# Patient Record
Sex: MALE | Race: BLACK OR AFRICAN AMERICAN | Employment: FULL TIME | URBAN - METROPOLITAN AREA
[De-identification: names, ages, dates, MRNs, and addresses within clinical notes are randomized per-mention and may not be internally consistent; named-entity substitution may affect disease eponyms.]

---

## 2018-05-14 ENCOUNTER — HOSPITAL ENCOUNTER (OUTPATIENT)
Dept: OCCUPATIONAL MEDICINE | Age: 53
Discharge: HOME | End: 2018-05-14

## 2018-05-14 DIAGNOSIS — T14.90XA INJURY: ICD-10-CM

## 2018-07-09 PROBLEM — E66.01 CLASS 3 SEVERE OBESITY DUE TO EXCESS CALORIES WITH SERIOUS COMORBIDITY AND BODY MASS INDEX (BMI) OF 45.0 TO 49.9 IN ADULT (HCC): Status: ACTIVE | Noted: 2018-07-09

## 2018-07-26 ENCOUNTER — ANESTHESIA EVENT (OUTPATIENT)
Dept: SURGERY | Age: 53
End: 2018-07-26
Payer: OTHER MISCELLANEOUS

## 2018-07-27 ENCOUNTER — ANESTHESIA (OUTPATIENT)
Dept: SURGERY | Age: 53
End: 2018-07-27
Payer: OTHER MISCELLANEOUS

## 2018-07-27 ENCOUNTER — HOSPITAL ENCOUNTER (OUTPATIENT)
Age: 53
Setting detail: OUTPATIENT SURGERY
Discharge: HOME | End: 2018-07-27
Attending: ORTHOPAEDIC SURGERY | Admitting: ORTHOPAEDIC SURGERY
Payer: OTHER MISCELLANEOUS

## 2018-07-27 VITALS
OXYGEN SATURATION: 97 % | DIASTOLIC BLOOD PRESSURE: 86 MMHG | BODY MASS INDEX: 46.38 KG/M2 | RESPIRATION RATE: 20 BRPM | TEMPERATURE: 98 F | WEIGHT: 305 LBS | SYSTOLIC BLOOD PRESSURE: 172 MMHG | HEART RATE: 74 BPM

## 2018-07-27 PROCEDURE — 77030033269 HC SLV COMPR SCD KNE2 CARD -B: Performed by: ORTHOPAEDIC SURGERY

## 2018-07-27 PROCEDURE — 77030027384 HC PRB ARTHSCP SERFAS STRY -C: Performed by: ORTHOPAEDIC SURGERY

## 2018-07-27 PROCEDURE — 76060000034 HC ANESTHESIA 1.5 TO 2 HR: Performed by: ORTHOPAEDIC SURGERY

## 2018-07-27 PROCEDURE — 77030035253 HC BIT DRL ICONIX DISP STRY -B: Performed by: ORTHOPAEDIC SURGERY

## 2018-07-27 PROCEDURE — 77030011640 HC PAD GRND REM COVD -A: Performed by: ORTHOPAEDIC SURGERY

## 2018-07-27 PROCEDURE — 77030004453 HC BUR SHV STRY -B: Performed by: ORTHOPAEDIC SURGERY

## 2018-07-27 PROCEDURE — 76010010054 HC POST OP PAIN BLOCK: Performed by: ORTHOPAEDIC SURGERY

## 2018-07-27 PROCEDURE — 74011250636 HC RX REV CODE- 250/636: Performed by: ANESTHESIOLOGY

## 2018-07-27 PROCEDURE — 76210000006 HC OR PH I REC 0.5 TO 1 HR: Performed by: ORTHOPAEDIC SURGERY

## 2018-07-27 PROCEDURE — 74011250637 HC RX REV CODE- 250/637: Performed by: ANESTHESIOLOGY

## 2018-07-27 PROCEDURE — 77030006891 HC BLD SHV RESECT STRY -B: Performed by: ORTHOPAEDIC SURGERY

## 2018-07-27 PROCEDURE — 77030018836 HC SOL IRR NACL ICUM -A: Performed by: ORTHOPAEDIC SURGERY

## 2018-07-27 PROCEDURE — 74011250636 HC RX REV CODE- 250/636: Performed by: ORTHOPAEDIC SURGERY

## 2018-07-27 PROCEDURE — 77030036560 HC SHLDR ARM PAD ABDUCTN S2SG -B: Performed by: ORTHOPAEDIC SURGERY

## 2018-07-27 PROCEDURE — 74011000250 HC RX REV CODE- 250: Performed by: ANESTHESIOLOGY

## 2018-07-27 PROCEDURE — C1713 ANCHOR/SCREW BN/BN,TIS/BN: HCPCS | Performed by: ORTHOPAEDIC SURGERY

## 2018-07-27 PROCEDURE — 77030003666 HC NDL SPINAL BD -A: Performed by: ORTHOPAEDIC SURGERY

## 2018-07-27 PROCEDURE — 76210000020 HC REC RM PH II FIRST 0.5 HR: Performed by: ORTHOPAEDIC SURGERY

## 2018-07-27 PROCEDURE — 77030031139 HC SUT VCRL2 J&J -A: Performed by: ORTHOPAEDIC SURGERY

## 2018-07-27 PROCEDURE — 77030016370 HC SUT ULTBRD S&N -B: Performed by: ORTHOPAEDIC SURGERY

## 2018-07-27 PROCEDURE — 77030003660 HC NDL SHTTL S&N -C: Performed by: ORTHOPAEDIC SURGERY

## 2018-07-27 PROCEDURE — 77030002933 HC SUT MCRYL J&J -A: Performed by: ORTHOPAEDIC SURGERY

## 2018-07-27 PROCEDURE — 77030003602 HC NDL NRV BLK BBMI -B: Performed by: ANESTHESIOLOGY

## 2018-07-27 PROCEDURE — 74011000250 HC RX REV CODE- 250

## 2018-07-27 PROCEDURE — 76942 ECHO GUIDE FOR BIOPSY: CPT | Performed by: ORTHOPAEDIC SURGERY

## 2018-07-27 PROCEDURE — 77030021678 HC GLIDESCP STAT DISP VERT -B: Performed by: ANESTHESIOLOGY

## 2018-07-27 PROCEDURE — 77030013367: Performed by: ORTHOPAEDIC SURGERY

## 2018-07-27 PROCEDURE — 77030020052 HC SUT PASS DISP S&N -B: Performed by: ORTHOPAEDIC SURGERY

## 2018-07-27 PROCEDURE — 76010000162 HC OR TIME 1.5 TO 2 HR INTENSV-TIER 1: Performed by: ORTHOPAEDIC SURGERY

## 2018-07-27 PROCEDURE — 74011250636 HC RX REV CODE- 250/636

## 2018-07-27 PROCEDURE — 77030008703 HC TU ET UNCUF COVD -A: Performed by: ANESTHESIOLOGY

## 2018-07-27 PROCEDURE — 77030019605: Performed by: ORTHOPAEDIC SURGERY

## 2018-07-27 DEVICE — FOOTPRINT ULTRA PK SUTURE ANCHOR 4.5
Type: IMPLANTABLE DEVICE | Site: SHOULDER | Status: FUNCTIONAL
Brand: FOOTPRINT

## 2018-07-27 DEVICE — 1.4MM ICONIX 1 ANCHOR W/INTELLIBRAID TECHNOLOGY 1 STRAND #2 FORCE FIBER
Type: IMPLANTABLE DEVICE | Site: SHOULDER | Status: FUNCTIONAL
Brand: ICONIX

## 2018-07-27 RX ORDER — NEOSTIGMINE METHYLSULFATE 1 MG/ML
INJECTION INTRAVENOUS AS NEEDED
Status: DISCONTINUED | OUTPATIENT
Start: 2018-07-27 | End: 2018-07-27 | Stop reason: HOSPADM

## 2018-07-27 RX ORDER — LIDOCAINE HYDROCHLORIDE 10 MG/ML
0.1 INJECTION INFILTRATION; PERINEURAL AS NEEDED
Status: DISCONTINUED | OUTPATIENT
Start: 2018-07-27 | End: 2018-07-27 | Stop reason: HOSPADM

## 2018-07-27 RX ORDER — GLYCOPYRROLATE 0.2 MG/ML
INJECTION INTRAMUSCULAR; INTRAVENOUS AS NEEDED
Status: DISCONTINUED | OUTPATIENT
Start: 2018-07-27 | End: 2018-07-27 | Stop reason: HOSPADM

## 2018-07-27 RX ORDER — LIDOCAINE HYDROCHLORIDE 20 MG/ML
INJECTION, SOLUTION EPIDURAL; INFILTRATION; INTRACAUDAL; PERINEURAL AS NEEDED
Status: DISCONTINUED | OUTPATIENT
Start: 2018-07-27 | End: 2018-07-27 | Stop reason: HOSPADM

## 2018-07-27 RX ORDER — MIDAZOLAM HYDROCHLORIDE 1 MG/ML
2 INJECTION, SOLUTION INTRAMUSCULAR; INTRAVENOUS
Status: DISCONTINUED | OUTPATIENT
Start: 2018-07-27 | End: 2018-07-27 | Stop reason: HOSPADM

## 2018-07-27 RX ORDER — DEXAMETHASONE SODIUM PHOSPHATE 4 MG/ML
INJECTION, SOLUTION INTRA-ARTICULAR; INTRALESIONAL; INTRAMUSCULAR; INTRAVENOUS; SOFT TISSUE AS NEEDED
Status: DISCONTINUED | OUTPATIENT
Start: 2018-07-27 | End: 2018-07-27 | Stop reason: HOSPADM

## 2018-07-27 RX ORDER — SODIUM CHLORIDE, SODIUM LACTATE, POTASSIUM CHLORIDE, CALCIUM CHLORIDE 600; 310; 30; 20 MG/100ML; MG/100ML; MG/100ML; MG/100ML
75 INJECTION, SOLUTION INTRAVENOUS CONTINUOUS
Status: DISCONTINUED | OUTPATIENT
Start: 2018-07-27 | End: 2018-07-27 | Stop reason: HOSPADM

## 2018-07-27 RX ORDER — PROPOFOL 10 MG/ML
INJECTION, EMULSION INTRAVENOUS AS NEEDED
Status: DISCONTINUED | OUTPATIENT
Start: 2018-07-27 | End: 2018-07-27 | Stop reason: HOSPADM

## 2018-07-27 RX ORDER — OXYCODONE HYDROCHLORIDE 5 MG/1
5 TABLET ORAL
Status: DISCONTINUED | OUTPATIENT
Start: 2018-07-27 | End: 2018-07-27 | Stop reason: HOSPADM

## 2018-07-27 RX ORDER — NALOXONE HYDROCHLORIDE 0.4 MG/ML
0.1 INJECTION, SOLUTION INTRAMUSCULAR; INTRAVENOUS; SUBCUTANEOUS
Status: DISCONTINUED | OUTPATIENT
Start: 2018-07-27 | End: 2018-07-27 | Stop reason: HOSPADM

## 2018-07-27 RX ORDER — MIDAZOLAM HYDROCHLORIDE 1 MG/ML
2 INJECTION, SOLUTION INTRAMUSCULAR; INTRAVENOUS ONCE
Status: COMPLETED | OUTPATIENT
Start: 2018-07-27 | End: 2018-07-27

## 2018-07-27 RX ORDER — SUCCINYLCHOLINE CHLORIDE 20 MG/ML
INJECTION INTRAMUSCULAR; INTRAVENOUS AS NEEDED
Status: DISCONTINUED | OUTPATIENT
Start: 2018-07-27 | End: 2018-07-27 | Stop reason: HOSPADM

## 2018-07-27 RX ORDER — EPINEPHRINE 1 MG/ML
INJECTION, SOLUTION, CONCENTRATE INTRAVENOUS AS NEEDED
Status: DISCONTINUED | OUTPATIENT
Start: 2018-07-27 | End: 2018-07-27 | Stop reason: HOSPADM

## 2018-07-27 RX ORDER — EPHEDRINE SULFATE 50 MG/ML
INJECTION, SOLUTION INTRAVENOUS AS NEEDED
Status: DISCONTINUED | OUTPATIENT
Start: 2018-07-27 | End: 2018-07-27 | Stop reason: HOSPADM

## 2018-07-27 RX ORDER — KETOROLAC TROMETHAMINE 30 MG/ML
30 INJECTION, SOLUTION INTRAMUSCULAR; INTRAVENOUS ONCE
Status: COMPLETED | OUTPATIENT
Start: 2018-07-27 | End: 2018-07-27

## 2018-07-27 RX ORDER — DIPHENHYDRAMINE HYDROCHLORIDE 50 MG/ML
12.5 INJECTION, SOLUTION INTRAMUSCULAR; INTRAVENOUS
Status: DISCONTINUED | OUTPATIENT
Start: 2018-07-27 | End: 2018-07-27 | Stop reason: HOSPADM

## 2018-07-27 RX ORDER — FAMOTIDINE 20 MG/1
20 TABLET, FILM COATED ORAL ONCE
Status: COMPLETED | OUTPATIENT
Start: 2018-07-27 | End: 2018-07-27

## 2018-07-27 RX ORDER — FLUMAZENIL 0.1 MG/ML
0.2 INJECTION INTRAVENOUS AS NEEDED
Status: DISCONTINUED | OUTPATIENT
Start: 2018-07-27 | End: 2018-07-27 | Stop reason: HOSPADM

## 2018-07-27 RX ORDER — ROCURONIUM BROMIDE 10 MG/ML
INJECTION, SOLUTION INTRAVENOUS AS NEEDED
Status: DISCONTINUED | OUTPATIENT
Start: 2018-07-27 | End: 2018-07-27 | Stop reason: HOSPADM

## 2018-07-27 RX ORDER — HYDROMORPHONE HYDROCHLORIDE 2 MG/ML
0.5 INJECTION, SOLUTION INTRAMUSCULAR; INTRAVENOUS; SUBCUTANEOUS
Status: DISCONTINUED | OUTPATIENT
Start: 2018-07-27 | End: 2018-07-27 | Stop reason: HOSPADM

## 2018-07-27 RX ORDER — ONDANSETRON 2 MG/ML
INJECTION INTRAMUSCULAR; INTRAVENOUS AS NEEDED
Status: DISCONTINUED | OUTPATIENT
Start: 2018-07-27 | End: 2018-07-27 | Stop reason: HOSPADM

## 2018-07-27 RX ORDER — FENTANYL CITRATE 50 UG/ML
100 INJECTION, SOLUTION INTRAMUSCULAR; INTRAVENOUS ONCE
Status: COMPLETED | OUTPATIENT
Start: 2018-07-27 | End: 2018-07-27

## 2018-07-27 RX ORDER — LABETALOL HYDROCHLORIDE 5 MG/ML
10 INJECTION, SOLUTION INTRAVENOUS ONCE
Status: COMPLETED | OUTPATIENT
Start: 2018-07-27 | End: 2018-07-27

## 2018-07-27 RX ORDER — OXYCODONE HYDROCHLORIDE 5 MG/1
10 TABLET ORAL
Status: DISCONTINUED | OUTPATIENT
Start: 2018-07-27 | End: 2018-07-27 | Stop reason: HOSPADM

## 2018-07-27 RX ADMIN — EPHEDRINE SULFATE 2.5 MG: 50 INJECTION, SOLUTION INTRAVENOUS at 10:16

## 2018-07-27 RX ADMIN — FENTANYL CITRATE 50 MCG: 50 INJECTION INTRAMUSCULAR; INTRAVENOUS at 08:42

## 2018-07-27 RX ADMIN — SODIUM CHLORIDE, SODIUM LACTATE, POTASSIUM CHLORIDE, AND CALCIUM CHLORIDE: 600; 310; 30; 20 INJECTION, SOLUTION INTRAVENOUS at 10:58

## 2018-07-27 RX ADMIN — Medication 3 G: at 09:34

## 2018-07-27 RX ADMIN — EPHEDRINE SULFATE 10 MG: 50 INJECTION, SOLUTION INTRAVENOUS at 10:07

## 2018-07-27 RX ADMIN — PROPOFOL 200 MG: 10 INJECTION, EMULSION INTRAVENOUS at 09:36

## 2018-07-27 RX ADMIN — GLYCOPYRROLATE 0.3 MG: 0.2 INJECTION INTRAMUSCULAR; INTRAVENOUS at 10:47

## 2018-07-27 RX ADMIN — MIDAZOLAM HYDROCHLORIDE 2 MG: 1 INJECTION, SOLUTION INTRAMUSCULAR; INTRAVENOUS at 08:42

## 2018-07-27 RX ADMIN — SODIUM CHLORIDE, SODIUM LACTATE, POTASSIUM CHLORIDE, AND CALCIUM CHLORIDE 75 ML/HR: 600; 310; 30; 20 INJECTION, SOLUTION INTRAVENOUS at 08:00

## 2018-07-27 RX ADMIN — ROCURONIUM BROMIDE 10 MG: 10 INJECTION, SOLUTION INTRAVENOUS at 09:46

## 2018-07-27 RX ADMIN — SUCCINYLCHOLINE CHLORIDE 180 MG: 20 INJECTION INTRAMUSCULAR; INTRAVENOUS at 09:36

## 2018-07-27 RX ADMIN — FAMOTIDINE 20 MG: 20 TABLET ORAL at 08:00

## 2018-07-27 RX ADMIN — EPHEDRINE SULFATE 5 MG: 50 INJECTION, SOLUTION INTRAVENOUS at 10:10

## 2018-07-27 RX ADMIN — KETOROLAC TROMETHAMINE 30 MG: 30 INJECTION, SOLUTION INTRAMUSCULAR at 11:19

## 2018-07-27 RX ADMIN — LIDOCAINE HYDROCHLORIDE 60 MG: 20 INJECTION, SOLUTION EPIDURAL; INFILTRATION; INTRACAUDAL; PERINEURAL at 09:36

## 2018-07-27 RX ADMIN — LABETALOL HYDROCHLORIDE 10 MG: 5 INJECTION INTRAVENOUS at 11:30

## 2018-07-27 RX ADMIN — DEXAMETHASONE SODIUM PHOSPHATE 4 MG: 4 INJECTION, SOLUTION INTRA-ARTICULAR; INTRALESIONAL; INTRAMUSCULAR; INTRAVENOUS; SOFT TISSUE at 10:15

## 2018-07-27 RX ADMIN — NEOSTIGMINE METHYLSULFATE 2 MG: 1 INJECTION INTRAVENOUS at 10:47

## 2018-07-27 RX ADMIN — EPHEDRINE SULFATE 2.5 MG: 50 INJECTION, SOLUTION INTRAVENOUS at 10:19

## 2018-07-27 RX ADMIN — ONDANSETRON 4 MG: 2 INJECTION INTRAMUSCULAR; INTRAVENOUS at 10:16

## 2018-07-27 NOTE — BRIEF OP NOTE
BRIEF OPERATIVE NOTE Date of Procedure: 7/27/2018 Preoperative Diagnosis: Strain of muscle(s) and tendon(s) of the rotator cuff of left shoulder, initial encounter [S46.012A] Strain of muscle, fascia and tendon of other parts of biceps, left arm, initial encounter [Y06.081H] Degenerative joint disease of left acromioclavicular joint [M19.012] Postoperative Diagnosis: Left Shoulder rotator cuff tear Procedure(s): SHOULDER ARTHROSCOPY ROTATOR CUFF REPAIR, BICEPS TENOTOMY, RESECTION DISTAL CLAVICLE/ LEFT/ INTERSCALENE Surgeon(s) and Role: Rickie Alvarez MD - Primary Surgical Assistant:  
 
 
Surgical Staff: 
Circ-1: Nichole Salter RN 
Circ-Relief: Deann Alston RN Scrub Tech-1: Sonia Desouza Scrub Tech-2: Quentin Paulino Event Time In Incision Start 1000 Incision Close Anesthesia: General  
Estimated Blood Loss:  
 
Specimens: * No specimens in log * Findings:  
  
Complications:  
 
Implants:  
Implant Name Type Inv. Item Serial No.  Lot No. LRB No. Used Action ANCHOR FOOTPRINT SUT 4.5MM --  - [id number]  ANCHOR FOOTPRINT SUT 4.5MM --   [employer] [id number] Left 1 Implanted ANCHOR SUT 1.4MM FORCEFIBER 2 -- [employer]IX 1 [employer] - [id number]   ANCHOR SUT 1.4MM FORCEFIBER 2 -- [employer]IX 1 [employer]   [employer] [id number] Left 1 Implanted

## 2018-07-27 NOTE — ANESTHESIA PROCEDURE NOTES
Peripheral Block Start time: 7/27/2018 8:43 AM 
End time: 7/27/2018 8:47 AM 
Performed by: Anahi Cheng Authorized by: Kathy Levi: Indications: at surgeon's request and post-op pain management Preanesthetic Checklist: patient identified, risks and benefits discussed, site marked, timeout performed, anesthesia consent given and patient being monitored Timeout Time: 08:42 Block Type:  
Block Type: Interscalene Laterality:  Left Monitoring:  Standard ASA monitoring, responsive to questions, oxygen, continuous pulse ox, frequent vital sign checks and heart rate Injection Technique:  Single shot Procedures: ultrasound guided and nerve stimulator Patient Position: supine Prep: chlorhexidine Location:  Interscalene Needle Type:  Stimuplex Needle Gauge:  22 G Needle Localization:  Nerve stimulator and ultrasound guidance Motor Response: minimal motor response >0.4 mA Medication Injected:  0.375% 
ropivacaine Adds:  Epi 1:200K Volume (mL):  35 
 
Assessment: 
Number of attempts:  1 Injection Assessment:  Incremental injection every 5 mL, negative aspiration for CSF, no paresthesia, ultrasound image on chart, local visualized surrounding nerve on ultrasound, negative aspiration for blood and no intravascular symptoms Patient tolerance:  Patient tolerated the procedure well with no immediate complications

## 2018-07-27 NOTE — PERIOP NOTES
New TORB from Dr. Teodoro Trejo. Toradol 30 mg IV given once now. Labatelol 10 mg IV given once now. See MAR.

## 2018-07-27 NOTE — DISCHARGE INSTRUCTIONS
Post-Operative Instructions   For  Shoulder Arthroscopy Rotator Cuff Repair  Phone:  [phone]    1. Apply ice to the shoulder as needed. 2. You may shower after the arthroscopy. Keep dressings in place for the first three days and do not get them wet. After three days, you may remove the dressings and leave the steri-strip bandages in place; they will peel off naturally. 3. Stay in sling at all times except to shower. 4. Begin therapy as ordered. 5. Use any pain medication as instructed. You should take your pain medication as soon as you feel the anesthetic wearing off. Do not wait until you are in severe pain to begin taking your pain medication. 6. You may have some side effects from your pain medication. If you have nausea, try taking your medication with food. For itching, you may take over the counter Benadryl. 7. You may have been given a prescription for Phenergan. This medication is used for nausea and vomiting. You do not need to get this prescription filled unless you have a problem. 8. If you have a problem, please call [address] [place] [address] at [phone]    Dmitriy Henrandez MD    [address] [place] [address], P.A.     TYPICAL SIDE EFFECTS OF PAIN MEDICATION:  *    Constipation: Drink lots of fluids. Over the counter stool softener if needed. *    Nausea: Take pain medication with food. Call your doctor with persistent nausea. ACTIVITY  · As tolerated and as directed by your doctor. · Bathe or shower as directed by your doctor. DIET  · Day of surgery: Clear liquids until no nausea or vomiting; small portion, light diet Toole foods (ex: baked chicken, plain rice, grits, scrambled eggs, toast). Nothing greasy, fried or spicy today. · Advance to regular diet on second day, unless your doctor orders otherwise. · If nausea and vomiting continues, call your doctor. PAIN  · Take pain medication as directed by your doctor.     · DO NOT take aspirin or blood thinners unless directed by your doctor. CALL YOUR DOCTOR IF    s Call your doctor if pain is NOT relieved by medication.   s Excessive bleeding that does not stop after holding pressure over the area  · Temperature of 101 degrees F or above  · Excessive redness, swelling or bruising, and/ or green or yellow, smelly discharge from incision    AFTER ANESTHESIA   · For the first 24 hours: DO NOT Drive, Drink alcoholic beverages, or Make important decisions. · Be aware of dizziness following anesthesia and while taking pain medication. DISCHARGE SUMMARY from Nurse    PATIENT INSTRUCTIONS:    After general anesthesia or intravenous sedation, for 24 hours or while taking prescription Narcotics:  · Limit your activities  · Do not drive and operate hazardous machinery  · Do not make important personal or business decisions  · Do  not drink alcoholic beverages  · If you have not urinated within 8 hours after discharge, please contact your surgeon on call. *  Please give a list of your current medications to your Primary Care Provider. *  Please update this list whenever your medications are discontinued, doses are      changed, or new medications (including over-the-counter products) are added. *  Please carry medication information at all times in case of emergency situations. Preventing Infection at Home  We care about preventing infection and avoiding the spread of germs - not only when you are in the hospital but also when you return home. When you return home from the hospital, its important to take the following steps to help prevent infection and avoid spreading germs that could infect you and others. Ask everyone in your home to follow these guidelines, too. Clean Your Hands  · Clean your hands whenever your hands are visibly dirty, before you eat, before or after touching your mouth, nose or eyes, and before preparing food.  Clean them after contact with body fluids, using the restroom, touching animals or changing diapers. · When washing hands, wet them with warm water and work up a lather. Rub hands for at least 15 seconds, then rinse them and pat them dry with a clean towel or paper towel. · When using hand sanitizers, it should take about 15 seconds to rub your hands dry. If not, you probably didnt apply enough . Cover Your Sneeze or Cough  Germs are released into the air whenever you sneeze or cough. To prevent the spread of infection:  · Turn away from other people before coughing or sneezing. · Cover your mouth or nose with a tissue when you cough or sneeze. Put the tissue in the trash. · If you dont have a tissue, cough or sneeze into your upper sleeve, not your hands. · Always clean your hands after coughing or sneezing. Care for Wounds  Your skin is your bodys first line of defense against germs, but an open wound leaves an easy way for germs to enter your body. To prevent infection:  · Clean your hands before and after changing wound dressings, and wear gloves to change dressings if recommended by your doctor. · Take special care with IV lines or other devices inserted into the body. If you must touch them, clean your hands first.  · Follow any specific instructions from your doctor to care for your wounds. Contact your doctor if you experience any signs of infection, such as fever or increased redness at the surgical or wound site. Keep a Clean Home  · Clean or wipe commonly touched hard surfaces like door handles, sinks, tabletops, phones and TV remotes. · Use products labeled disinfectant to kill harmful bacteria and viruses. · Use a clean cloth or paper towel to clean and dry surfaces. Wiping surfaces with a dirty dishcloth, sponge or towel will only spread germs. · Never share toothbrushes, saenz, drinking glasses, utensils, razor blades, face cloths or bath towels to avoid spreading germs.   · Be sure that the linens that you sleep on are clean.  · Keep pets away from wounds and wash your hands after touching pets, their toys or bedding. We care about you and your health. Remember, preventing infections is a team effort between you, your family, friends and health care providers. These are general instructions for a healthy lifestyle:    No smoking/ No tobacco products/ Avoid exposure to second hand smoke    Surgeon General's Warning:  Quitting smoking now greatly reduces serious risk to your health. Obesity, smoking, and sedentary lifestyle greatly increases your risk for illness    A healthy diet, regular physical exercise & weight monitoring are important for maintaining a healthy lifestyle    You may be retaining fluid if you have a history of heart failure or if you experience any of the following symptoms:  Weight gain of 3 pounds or more overnight or 5 pounds in a week, increased swelling in our hands or feet or shortness of breath while lying flat in bed. Please call your doctor as soon as you notice any of these symptoms; do not wait until your next office visit. Recognize signs and symptoms of STROKE:    F-face looks uneven    A-arms unable to move or move unevenly    S-speech slurred or non-existent    T-time-call 911 as soon as signs and symptoms begin-DO NOT go       Back to bed or wait to see if you get better-TIME IS BRAIN.

## 2018-07-27 NOTE — H&P
Outpatient Surgery History and Physical:  Gris Mason was seen and examined. CHIEF COMPLAINT:    l shoulder . PE:   There were no vitals taken for this visit. Heart:   Regular rhythm      Lungs:  Are clear      Past Medical History:    Patient Active Problem List    Diagnosis    Class 3 severe obesity due to excess calories with serious comorbidity and body mass index (BMI) of 45.0 to 49.9 in adult ([address]    Hyperlipidemia    IFG (impaired fasting glucose)    Hypertension       Surgical History:   Past Surgical History:   Procedure Laterality Date    HX APPENDECTOMY  as child       Social History: Patient  reports that he has never smoked. He has never used smokeless tobacco. He reports that he drinks about 1.8 oz of alcohol per week  He reports that he does not use illicit drugs. Family History:   Family History   Problem Relation Age of Onset    Heart Disease Father     Arthritis-osteo Mother     Heart Attack Mother     Diabetes Maternal Grandmother     Lung Cancer Maternal Grandfather      smoker       Allergies: Reviewed per EMR  No Known Allergies    Medications:    No current facility-administered medications on file prior to encounter. Current Outpatient Prescriptions on File Prior to Encounter   Medication Sig    cyclobenzaprine (FLEXERIL) 5 mg tablet Take 5 mg by mouth nightly.  lisinopril (PRINIVIL, ZESTRIL) 20 mg tablet Take 1 Tab by mouth daily. The surgery is planned for the  Shoulder . History and physical has been reviewed. The patient has been examined. There have been no significant clinical changes since the completion of the originally dated History and Physical.  Patient identified by surgeon; surgical site was confirmed by patient and surgeon. The patient is here today for outpatient surgery. I have examined the patient, no changes are noted in the patient's medical status.  Necessity for the procedure/care is still present and the history and physical above is current. See the office notes for the full long term history of the problem. Please see the recent office notes for the musculoskeletal examination.     Signed By: Kori Perez MD     July 27, 2018 6:53 AM

## 2018-07-27 NOTE — IP AVS SNAPSHOT
[address] 
[phone] Patient: Cece Velasco MRN: [id number] :1965 About your hospitalization You were admitted on:  2018 You last received care in the:  [facility] OP PACU You were discharged on:  2018 Why you were hospitalized Your primary diagnosis was:  Not on File Follow-up Information Follow up With Details Comments Contact Info Ian Torrez MD  Office will call to schedule follow up. [place] [address] [facility] [address] 
[phone] Yeni Acevedo MD   [address] [facility] [address] [facility] [address] 
[phone] Your Scheduled Appointments 2018  8:55 AM EDT  
LAB with [facility] [address] [place] [address] ([facility] INTERNAL MEDICINE) [address] [place] [id number] [phone] Wednesday August 15, 2018  9:00 AM EDT Complete Physical with Yeni Acevedo MD  
[address] [place] [address] Primary [facility] ([facility] INTERNAL MEDICINE) [address] [place] [address] [address] [place] [phone] Discharge Orders None A check susi indicates which time of day the medication should be taken. My Medications ASK your doctor about these medications Instructions Each Dose to Equal  
 Morning Noon Evening Bedtime  
 cyclobenzaprine 5 mg tablet Commonly known as:  FLEXERIL Your last dose was: Your next dose is: Take 5 mg by mouth nightly. 5 mg  
    
   
   
   
  
 lisinopril 20 mg tablet Commonly known as:  Mollie Xavi Your last dose was: Your next dose is: Take 1 Tab by mouth daily. 20 mg  
    
   
   
   
  
 multivitamin tablet Commonly known as:  ONE A DAY Your last dose was: Your next dose is: Take 1 Tab by mouth daily. Stopped 7/26/18  
 1 Tab Discharge Instructions Post-Operative Instructions For Shoulder Arthroscopy Rotator Cuff Repair Phone:  [phone] 1. Apply ice to the shoulder as needed. 2. You may shower after the arthroscopy. Keep dressings in place for the first three days and do not get them wet. After three days, you may remove the dressings and leave the steri-strip bandages in place; they will peel off naturally. 3. Stay in sling at all times except to shower. 4. Begin therapy as ordered. 5. Use any pain medication as instructed. You should take your pain medication as soon as you feel the anesthetic wearing off. Do not wait until you are in severe pain to begin taking your pain medication. 6. You may have some side effects from your pain medication. If you have nausea, try taking your medication with food. For itching, you may take over the counter Benadryl. 7. You may have been given a prescription for Phenergan. This medication is used for nausea and vomiting. You do not need to get this prescription filled unless you have a problem. 8. If you have a problem, please call [place] at [phone] Viraj Romero MD 
 
[facility]  
 
TYPICAL SIDE EFFECTS OF PAIN MEDICATION: 
*    Constipation: Drink lots of fluids. Over the counter stool softener if needed. *    Nausea: Take pain medication with food. Call your doctor with persistent nausea. ACTIVITY · As tolerated and as directed by your doctor. · Bathe or shower as directed by your doctor. DIET 
· Day of surgery: Clear liquids until no nausea or vomiting; small portion, light diet Luna foods (ex: baked chicken, plain rice, grits, scrambled eggs, toast). Nothing greasy, fried or spicy today. · Advance to regular diet on second day, unless your doctor orders otherwise. · If nausea and vomiting continues, call your doctor. PAIN 
· Take pain medication as directed by your doctor. · DO NOT take aspirin or blood thinners unless directed by your doctor. CALL YOUR DOCTOR IF   
s Call your doctor if pain is NOT relieved by medication.  
s Excessive bleeding that does not stop after holding pressure over the area · Temperature of 101 degrees F or above · Excessive redness, swelling or bruising, and/ or green or yellow, smelly discharge from incision AFTER ANESTHESIA · For the first 24 hours: DO NOT Drive, Drink alcoholic beverages, or Make important decisions. · Be aware of dizziness following anesthesia and while taking pain medication. DISCHARGE SUMMARY from Nurse PATIENT INSTRUCTIONS: 
 
After general anesthesia or intravenous sedation, for 24 hours or while taking prescription Narcotics: · Limit your activities · Do not drive and operate hazardous machinery · Do not make important personal or business decisions · Do  not drink alcoholic beverages · If you have not urinated within 8 hours after discharge, please contact your surgeon on call. *  Please give a list of your current medications to your Primary Care Provider. *  Please update this list whenever your medications are discontinued, doses are 
    changed, or new medications (including over-the-counter products) are added. *  Please carry medication information at all times in case of emergency situations. Preventing Infection at Home We care about preventing infection and avoiding the spread of germs  not only when you are in the hospital but also when you return home. When you return home from the hospital, its important to take the following steps to help prevent infection and avoid spreading germs that could infect you and others. Ask everyone in your home to follow these guidelines, too. Clean Your Hands · Clean your hands whenever your hands are visibly dirty, before you eat, before or after touching your mouth, nose or eyes, and before preparing food. Clean them after contact with body fluids, using the restroom, touching animals or changing diapers. · When washing hands, wet them with warm water and work up a lather. Rub hands for at least 15 seconds, then rinse them and pat them dry with a clean towel or paper towel. · When using hand sanitizers, it should take about 15 seconds to rub your hands dry. If not, you probably didnt apply enough . Cover Your Sneeze or Cough Germs are released into the air whenever you sneeze or cough. To prevent the spread of infection: · Turn away from other people before coughing or sneezing. · Cover your mouth or nose with a tissue when you cough or sneeze. Put the tissue in the trash. · If you dont have a tissue, cough or sneeze into your upper sleeve, not your hands. · Always clean your hands after coughing or sneezing. Care for Wounds Your skin is your bodys first line of defense against germs, but an open wound leaves an easy way for germs to enter your body. To prevent infection: · Clean your hands before and after changing wound dressings, and wear gloves to change dressings if recommended by your doctor. · Take special care with IV lines or other devices inserted into the body. If you must touch them, clean your hands first. 
· Follow any specific instructions from your doctor to care for your wounds. Contact your doctor if you experience any signs of infection, such as fever or increased redness at the surgical or wound site. Keep a [address] · Clean or wipe commonly touched hard surfaces like door handles, sinks, tabletops, phones and TV remotes. · Use products labeled disinfectant to kill harmful bacteria and viruses. · Use a clean cloth or paper towel to clean and dry surfaces.  Wiping surfaces with a dirty dishcloth, sponge or towel will only spread germs. · Never share toothbrushes, saenz, drinking glasses, utensils, razor blades, face cloths or bath towels to avoid spreading germs. · Be sure that the linens that you sleep on are clean. · Keep pets away from wounds and wash your hands after touching pets, their toys or bedding. We care about you and your health. Remember, preventing infections is a team effort between you, your family, friends and health care providers. These are general instructions for a healthy lifestyle: No smoking/ No tobacco products/ Avoid exposure to second hand smoke Surgeon General's Warning:  Quitting smoking now greatly reduces serious risk to your health. Obesity, smoking, and sedentary lifestyle greatly increases your risk for illness A healthy diet, regular physical exercise & weight monitoring are important for maintaining a healthy lifestyle You may be retaining fluid if you have a history of heart failure or if you experience any of the following symptoms:  Weight gain of 3 pounds or more overnight or 5 pounds in a week, increased swelling in our hands or feet or shortness of breath while lying flat in bed. Please call your doctor as soon as you notice any of these symptoms; do not wait until your next office visit. Recognize signs and symptoms of STROKE: 
 
F-face looks uneven A-arms unable to move or move unevenly S-speech slurred or non-existent T-time-call 911 as soon as signs and symptoms begin-DO NOT go Back to bed or wait to see if you get better-TIME IS BRAIN. Introducing [facility]! [employer] introduces Piki patient portal. Now you can access parts of your medical record, email your doctor's office, and request medication refills online. 1. In your internet browser, go to [email or web address] 2. Click on the First Time User? Click Here link in the Sign In box. You will see the New Member Sign Up page. 3. Enter your Sourcery Access Code exactly as it appears below. You will not need to use this code after youve completed the sign-up process. If you do not sign up before the expiration date, you must request a new code. · Sourcery Access Code: [email or web address] Expires: 8/12/2018 12:05 PM 
 
4. Enter the last four digits of your Social Security Number (xxxx) and Date of Birth (mm/dd/yyyy) as indicated and click Submit. You will be taken to the next sign-up page. 5. Create a Sourcery ID. This will be your Sourcery login ID and cannot be changed, so think of one that is secure and easy to remember. 6. Create a Sourcery password. You can change your password at any time. 7. Enter your Password Reset Question and Answer. This can be used at a later time if you forget your password. 8. Enter your e-mail address. You will receive e-mail notification when new information is available in [address] 9. Click Sign Up. You can now view and download portions of your medical record. 10. Click the Download Summary menu link to download a portable copy of your medical information. If you have questions, please visit the Frequently Asked Questions section of the Sourcery website. Remember, Sourcery is NOT to be used for urgent needs. For medical emergencies, dial 911. Now available from your iPhone and Android! Introducing Lars Langford As a [facility] patient, I wanted to make you aware of our electronic visit tool called Lars Langford. [facility] 24/7 allows you to connect within minutes with a medical provider 24 hours a day, seven days a week via a mobile device or tablet or logging into a secure website from your computer. You can access Lars Langford from anywhere in the [place].  
 
A virtual visit might be right for you when you have a simple condition and feel like you just dont want to get out of bed, or cant get away from work for an appointment, when your regular [address] [place] [address] provider is not available (evenings, weekends or holidays), or when youre out of town and need minor care. Electronic visits cost only $49 and if the [address] [place] [address] 24/7 provider determines a prescription is needed to treat your condition, one can be electronically transmitted to a nearby pharmacy*. Please take a moment to enroll today if you have not already done so. The enrollment process is free and takes just a few minutes. To enroll, please download the [facility] 24/7 rehana to your tablet or phone, or visit [email or web address] to enroll on your computer. And, as an [address] [place] [address] patient with a [employer] account, the results of your visits will be scanned into your electronic medical record and your primary care provider will be able to view the scanned results. We urge you to continue to see your regular [address] [place] [address] provider for your ongoing medical care. And while your primary care provider may not be the one available when you seek a [employer] virtual visit, the peace of mind you get from getting a real diagnosis real time can be priceless. For more information on [employer], view our Frequently Asked Questions (FAQs) at [email or web address]. Sincerely, 
 
Alise Angeles MD 
Chief Medical Officer Xavier Zepeda *:  certain medications cannot be prescribed via [employer] Providers Seen During Your Hospitalization Provider Specialty Primary office phone Ian Torrez MD Orthopedic Surgery [phone] Your Primary Care Physician (PCP) Primary Care Physician Office Phone Office Fax Harleen Monroy [phone] You are allergic to the following No active allergies Recent Documentation Weight BMI Smoking Status 138.3 kg 46.38 kg/m2 Never Smoker Emergency Contacts Name Discharge Info Relation Home Work Mobile Raquel Mccracken  Spouse [[id number]]   [phone] Patient Belongings The following personal items are in your possession at time of discharge: 
  Dental Appliances: None         Home Medications: None   Jewelry: None  Clothing: Footwear, Pants, Shirt    Other Valuables: None Please provide this summary of care documentation to your next provider. Signatures-by signing, you are acknowledging that this After Visit Summary has been reviewed with you and you have received a copy. Patient Signature:  ____________________________________________________________ Date:  ____________________________________________________________  
  
[facility] Provider Signature:  ____________________________________________________________ Date:  ____________________________________________________________

## 2018-07-27 NOTE — OP NOTES
[facility] REPORT    Francoise Mehta  MR#: [id number]  : 1965  ACCOUNT #: [de-identified]   DATE OF SERVICE: 2018    PREOPERATIVE DIAGNOSIS:  Rotator cuff tear. POSTOPERATIVE DIAGNOSES:  1.  Massive rotator cuff tear. 2.  Biceps tear. 3.  Impingement of the left shoulder. PROCEDURES PERFORMED:   1. Arthroscopically assisted rotator cuff repair using the [employer] and [employer] footprint system and [employer] system. 2.  Biceps tenotomy. 3.  Subacromial decompression of the left shoulder. SURGEON:  Eryn Quan MD    ANESTHESIA:  General.    ESTIMATED BLOOD LOSS:  Minimal.    SPECIMENS REMOVED:  None. COMPLICATIONS:  None. IMPLANTS:  None. DESCRIPTION OF PROCEDURE:  After an adequate level of general anesthesia was obtained, the left shoulder was prepped and draped in the usual sterile fashion. The joint was distended posteriorly with saline. He received antibiotics. The patient had a massive rotator cuff tear. The biceps was torn and just a few fibers left and a photo is made for the patient and it is subluxated significantly. The articular surface looked good, but he was starting to get some degenerative changes superiorly. It was easy to slip the arthroscope in the subacromial space as he had a very large tear. The only good quality tissue was more posteriorly. He did not have a lot of pain in his AC joint. Lateral portal was made and an additional anterior portal was made and was elected to perform a repair. The best tissue was more posteriorly. A biceps tenotomy was performed. The best tissue was posteriorly and was pulled to an area of being secured without any undue tension, but it was necessarily to put the suture anchors a bit more superiorly. Some of the bone was denuded in that area to provide a vascular healing bed.   Sutures were spaced accordingly through the cannula initially posteriorly and then a bit more anterior, but it was not possible within limited excursion to advance the rotator cuff to its normal attachment. A  hole was made in the bone and the suture anchor was placed snugly into the bone and photos made for the patient after tightening the device with the sutures. This was supplemented by using [employer] system where sutures were placed more anteriorly. Photos made for the patient. The wound was then copiously irrigated. The portals closed with Monocryl Steri-Strips and sterile dressings applied. The arm was placed in a shoulder immobilizer and tolerated the procedure well.       MD Pascual Sutton / HUNTER  D: 07/27/2018 10:45     T: 07/27/2018 10:56  JOB #: [id number]  CC: Lucio Pulliam MD

## 2018-07-27 NOTE — ANESTHESIA PREPROCEDURE EVALUATION
Anesthetic History No history of anesthetic complications Review of Systems / Medical History Patient summary reviewed and pertinent labs reviewed Pulmonary Within defined limits Neuro/Psych Within defined limits Cardiovascular Hypertension: well controlled Exercise tolerance: >4 METS 
  
GI/Hepatic/Renal 
Within defined limits Endo/Other Morbid obesity Other Findings Physical Exam 
 
Airway Mallampati: II 
TM Distance: 4 - 6 cm Neck ROM: normal range of motion Mouth opening: Normal 
 
 Cardiovascular Rhythm: regular Rate: normal 
 
 
 
 Dental 
No notable dental hx Pulmonary Breath sounds clear to auscultation Abdominal 
 
 
 
 Other Findings Anesthetic Plan ASA: 3 Anesthesia type: general 
 
 
Post-op pain plan if not by surgeon: peripheral nerve block single Anesthetic plan and risks discussed with: Patient and Family

## 2018-07-27 NOTE — ANESTHESIA POSTPROCEDURE EVALUATION
Post-Anesthesia Evaluation and Assessment Patient: Dylan Abebe MRN: [id number]  SSN: [id number] YOB: 1965  Age: 48 y.o. Sex: male Cardiovascular Function/Vital Signs Visit Vitals  /86  Pulse 70  Temp 36.7 °C (98 °F)  Resp 19  Wt 138.3 kg (305 lb)  SpO2 96%  BMI 46.38 kg/m2 Patient is status post general anesthesia for Procedure(s): SHOULDER ARTHROSCOPY ROTATOR CUFF REPAIR, . Nausea/Vomiting: None Postoperative hydration reviewed and adequate. Pain: 
Pain Scale 1: Numeric (0 - 10) (07/27/18 1119) Pain Intensity 1: 6 (07/27/18 1119) Managed Neurological Status:  
Neuro (WDL): Exceptions to WDL (07/27/18 1104) Neuro Neurologic State: Drowsy (07/27/18 1104) LUE Motor Response: Numbness (07/27/18 1104) At baseline Mental Status and Level of Consciousness: Arousable Pulmonary Status:  
O2 Device: Oxygen mask (07/27/18 1104) Adequate oxygenation and airway patent Complications related to anesthesia: None Post-anesthesia assessment completed. No concerns Signed By: Aviva Dietz MD   
 July 27, 2018

## 2018-08-08 PROBLEM — Z98.890 HISTORY OF REPAIR OF LEFT ROTATOR CUFF: Status: ACTIVE | Noted: 2018-08-08

## 2019-11-01 ENCOUNTER — HOSPITAL ENCOUNTER (OUTPATIENT)
Dept: LAB | Age: 54
Discharge: HOME | End: 2019-11-01

## 2019-11-01 PROCEDURE — 88305 TISSUE EXAM BY PATHOLOGIST: CPT

## 2021-12-14 ENCOUNTER — HOSPITAL ENCOUNTER (OUTPATIENT)
Dept: GENERAL RADIOLOGY | Age: 56
Discharge: HOME | End: 2021-12-14
Payer: COMMERCIAL

## 2021-12-14 PROBLEM — M17.11 OSTEOARTHRITIS OF RIGHT KNEE: Status: ACTIVE | Noted: 2021-12-10

## 2021-12-14 PROCEDURE — 71046 X-RAY EXAM CHEST 2 VIEWS: CPT

## 2021-12-16 PROBLEM — I44.0 FIRST DEGREE AV BLOCK: Status: ACTIVE | Noted: 2021-12-16

## 2021-12-16 PROBLEM — Z01.810 PREOP CARDIOVASCULAR EXAM: Status: ACTIVE | Noted: 2021-12-16

## 2022-01-15 PROBLEM — Z01.810 PREOP CARDIOVASCULAR EXAM: Status: RESOLVED | Noted: 2021-12-16 | Resolved: 2022-01-15

## 2022-03-19 PROBLEM — I44.0 FIRST DEGREE AV BLOCK: Status: ACTIVE | Noted: 2021-12-16

## 2022-03-19 PROBLEM — Z98.890 HISTORY OF REPAIR OF LEFT ROTATOR CUFF: Status: ACTIVE | Noted: 2018-08-08

## 2022-03-19 PROBLEM — E66.813 CLASS 3 SEVERE OBESITY DUE TO EXCESS CALORIES WITH SERIOUS COMORBIDITY AND BODY MASS INDEX (BMI) OF 40.0 TO 44.9 IN ADULT: Status: ACTIVE | Noted: 2018-07-09

## 2022-03-19 PROBLEM — M17.11 OSTEOARTHRITIS OF RIGHT KNEE: Status: ACTIVE | Noted: 2021-12-10

## 2022-03-19 PROBLEM — E66.01 CLASS 3 SEVERE OBESITY DUE TO EXCESS CALORIES WITH SERIOUS COMORBIDITY AND BODY MASS INDEX (BMI) OF 40.0 TO 44.9 IN ADULT (HCC): Status: ACTIVE | Noted: 2018-07-09

## 2022-04-18 PROBLEM — Z96.651 HISTORY OF RIGHT KNEE JOINT REPLACEMENT: Status: ACTIVE | Noted: 2022-04-18

## 2022-07-13 ENCOUNTER — OFFICE VISIT (OUTPATIENT)
Dept: INTERNAL MEDICINE CLINIC | Facility: CLINIC | Age: 57
End: 2022-07-13
Payer: COMMERCIAL

## 2022-07-13 VITALS
DIASTOLIC BLOOD PRESSURE: 80 MMHG | BODY MASS INDEX: 42.22 KG/M2 | TEMPERATURE: 98.6 F | SYSTOLIC BLOOD PRESSURE: 131 MMHG | HEIGHT: 68 IN | RESPIRATION RATE: 16 BRPM | WEIGHT: 278.6 LBS | HEART RATE: 81 BPM | OXYGEN SATURATION: 98 %

## 2022-07-13 DIAGNOSIS — R73.03 PREDIABETES: ICD-10-CM

## 2022-07-13 DIAGNOSIS — Z23 NEED FOR SHINGLES VACCINE: ICD-10-CM

## 2022-07-13 DIAGNOSIS — I10 PRIMARY HYPERTENSION: Primary | ICD-10-CM

## 2022-07-13 DIAGNOSIS — E78.2 MIXED HYPERLIPIDEMIA: ICD-10-CM

## 2022-07-13 DIAGNOSIS — R35.1 NOCTURIA: ICD-10-CM

## 2022-07-13 PROCEDURE — 99213 OFFICE O/P EST LOW 20 MIN: CPT | Performed by: INTERNAL MEDICINE

## 2022-07-13 RX ORDER — HYDRALAZINE HYDROCHLORIDE 50 MG/1
TABLET, FILM COATED ORAL
Qty: 180 TABLET | Refills: 1 | Status: SHIPPED | OUTPATIENT
Start: 2022-07-13

## 2022-07-13 RX ORDER — ATORVASTATIN CALCIUM 10 MG/1
10 TABLET, FILM COATED ORAL DAILY
Qty: 90 TABLET | Refills: 1 | Status: SHIPPED | OUTPATIENT
Start: 2022-07-13

## 2022-07-13 RX ORDER — ZOSTER VACCINE RECOMBINANT, ADJUVANTED 50 MCG/0.5
0.5 KIT INTRAMUSCULAR SEE ADMIN INSTRUCTIONS
Qty: 0.5 ML | Refills: 0 | Status: SHIPPED | OUTPATIENT
Start: 2022-07-13 | End: 2023-01-09

## 2022-07-13 RX ORDER — LOSARTAN POTASSIUM AND HYDROCHLOROTHIAZIDE 25; 100 MG/1; MG/1
1 TABLET ORAL DAILY
Qty: 90 TABLET | Refills: 1 | Status: SHIPPED | OUTPATIENT
Start: 2022-07-13

## 2022-07-13 RX ORDER — HYDRALAZINE HYDROCHLORIDE 50 MG/1
TABLET, FILM COATED ORAL
COMMUNITY
Start: 2022-06-13 | End: 2022-07-13 | Stop reason: SDUPTHER

## 2022-07-13 RX ORDER — AMLODIPINE BESYLATE 10 MG/1
10 TABLET ORAL DAILY
Qty: 90 TABLET | Refills: 1 | Status: SHIPPED | OUTPATIENT
Start: 2022-07-13

## 2022-07-13 ASSESSMENT — PATIENT HEALTH QUESTIONNAIRE - PHQ9
SUM OF ALL RESPONSES TO PHQ QUESTIONS 1-9: 0
SUM OF ALL RESPONSES TO PHQ QUESTIONS 1-9: 0
SUM OF ALL RESPONSES TO PHQ9 QUESTIONS 1 & 2: 0
SUM OF ALL RESPONSES TO PHQ QUESTIONS 1-9: 0
1. LITTLE INTEREST OR PLEASURE IN DOING THINGS: 0
2. FEELING DOWN, DEPRESSED OR HOPELESS: 0
SUM OF ALL RESPONSES TO PHQ QUESTIONS 1-9: 0

## 2022-07-13 ASSESSMENT — ENCOUNTER SYMPTOMS
RHINORRHEA: 0
SHORTNESS OF BREATH: 0
BLOOD IN STOOL: 0
COUGH: 0
ABDOMINAL PAIN: 0

## 2022-07-13 NOTE — PROGRESS NOTES
Take 1 tablet by mouth daily, Disp: 90 tablet, Rfl: 1    hydrALAZINE (APRESOLINE) 50 MG tablet, TAKE 1 TABLET BY MOUTH TWO TIMES A DAY., Disp: 180 tablet, Rfl: 1    losartan-hydroCHLOROthiazide (HYZAAR) 100-25 MG per tablet, Take 1 tablet by mouth daily, Disp: 90 tablet, Rfl: 1    atorvastatin (LIPITOR) 10 MG tablet, Take 1 tablet by mouth daily, Disp: 90 tablet, Rfl: 1    zoster recombinant adjuvanted vaccine (SHINGRIX) 50 MCG/0.5ML SUSR injection, Inject 0.5 mLs into the muscle See Admin Instructions 1 dose now and repeat in 2-6 months, Disp: 0.5 mL, Rfl: 0    No Known Allergies    Review of Systems   Constitutional: Negative for chills, fatigue and fever. HENT: Negative for congestion, postnasal drip and rhinorrhea. Eyes: Negative for visual disturbance. Respiratory: Negative for cough and shortness of breath. Cardiovascular: Negative for chest pain and palpitations. Gastrointestinal: Negative for abdominal pain and blood in stool. Genitourinary: Negative for dysuria and frequency. Musculoskeletal: Negative for arthralgias and myalgias. Skin: Negative. Neurological: Negative for numbness and headaches. Psychiatric/Behavioral: Negative for dysphoric mood and sleep disturbance. The patient is not nervous/anxious. All other systems reviewed and are negative. Objective:  /80   Pulse 81   Temp 98.6 °F (37 °C) (Temporal)   Resp 16   Ht 5' 8\" (1.727 m)   Wt 278 lb 9.6 oz (126.4 kg)   SpO2 98%   BMI 42.36 kg/m²     Examination:  Physical Exam  Vitals reviewed. Constitutional:       Appearance: Normal appearance. He is obese. HENT:      Head: Normocephalic and atraumatic. Nose: Nose normal.      Mouth/Throat:      Mouth: Mucous membranes are moist.      Pharynx: Oropharynx is clear. Eyes:      Extraocular Movements: Extraocular movements intact. Conjunctiva/sclera: Conjunctivae normal.      Pupils: Pupils are equal, round, and reactive to light. Cardiovascular:      Rate and Rhythm: Normal rate and regular rhythm. Pulses: Normal pulses. Heart sounds: Normal heart sounds. Pulmonary:      Effort: Pulmonary effort is normal.      Breath sounds: Normal breath sounds. Abdominal:      General: Abdomen is flat. Bowel sounds are normal.      Palpations: Abdomen is soft. Musculoskeletal:         General: Normal range of motion. Cervical back: Normal range of motion and neck supple. Skin:     General: Skin is warm and dry. Neurological:      General: No focal deficit present. Mental Status: He is alert and oriented to person, place, and time. Mental status is at baseline. Psychiatric:         Mood and Affect: Mood normal.         Behavior: Behavior normal.           Assessment/Plan:    Keily Li was seen today for hypertension and medication refill. Diagnoses and all orders for this visit:    Primary hypertension  Stable, continue medication, diet. -     amLODIPine (NORVASC) 10 MG tablet; Take 1 tablet by mouth daily  -     hydrALAZINE (APRESOLINE) 50 MG tablet; TAKE 1 TABLET BY MOUTH TWO TIMES A DAY. -     losartan-hydroCHLOROthiazide (HYZAAR) 100-25 MG per tablet; Take 1 tablet by mouth daily    Mixed hyperlipidemia  -     atorvastatin (LIPITOR) 10 MG tablet; Take 1 tablet by mouth daily  -     CBC with Auto Differential; Future  -     Comprehensive Metabolic Panel; Future  -     Lipid Panel; Future  -     T4, Free; Future  -     TSH; Future  -     Urinalysis; Future    Prediabetes  -     Hemoglobin A1C; Future    Need for shingles vaccine  -     zoster recombinant adjuvanted vaccine [facility]) 50 MCG/0.5ML SUSR injection; Inject 0.5 mLs into the muscle See Admin Instructions 1 dose now and repeat in 2-6 months    Nocturia  -     PSA, Total and Free; Future          Follow-up and Dispositions    · Return in about 6 months (around 1/13/2023), or if symptoms worsen or fail to improve, for CPX w/ labs.          Medication Reconciliation:  Current Medications Verified: Current medications/ immunizations were reviewed, including purpose, with patient. Family History, Social History, Current and Past Medical History was reviewed with patient and updated at today's office visit. Medication Reconciliation list was given to patient/ family. Patient was advised to discard old medication lists and provide all providers with current list at each visit and carry list with them in case of emergency.     Completed By:   Oleg Beauchamp MD    [facility]  [address] [place] [address], [facility]  [place], [address]  [phone]  [phone] fax  4:44 PM

## 2022-11-30 ENCOUNTER — OFFICE VISIT (OUTPATIENT)
Dept: INTERNAL MEDICINE CLINIC | Facility: CLINIC | Age: 57
End: 2022-11-30
Payer: COMMERCIAL

## 2022-11-30 VITALS
HEART RATE: 85 BPM | RESPIRATION RATE: 16 BRPM | SYSTOLIC BLOOD PRESSURE: 132 MMHG | WEIGHT: 300.8 LBS | TEMPERATURE: 97.9 F | HEIGHT: 68 IN | BODY MASS INDEX: 45.59 KG/M2 | DIASTOLIC BLOOD PRESSURE: 70 MMHG | OXYGEN SATURATION: 99 %

## 2022-11-30 DIAGNOSIS — E78.2 MIXED HYPERLIPIDEMIA: ICD-10-CM

## 2022-11-30 DIAGNOSIS — E66.01 CLASS 3 SEVERE OBESITY DUE TO EXCESS CALORIES WITH SERIOUS COMORBIDITY AND BODY MASS INDEX (BMI) OF 40.0 TO 44.9 IN ADULT (HCC): ICD-10-CM

## 2022-11-30 DIAGNOSIS — I10 PRIMARY HYPERTENSION: ICD-10-CM

## 2022-11-30 DIAGNOSIS — R73.03 PREDIABETES: ICD-10-CM

## 2022-11-30 DIAGNOSIS — Z01.818 PREOP EXAMINATION: Primary | ICD-10-CM

## 2022-11-30 PROCEDURE — 3078F DIAST BP <80 MM HG: CPT | Performed by: INTERNAL MEDICINE

## 2022-11-30 PROCEDURE — 99214 OFFICE O/P EST MOD 30 MIN: CPT | Performed by: INTERNAL MEDICINE

## 2022-11-30 PROCEDURE — 3075F SYST BP GE 130 - 139MM HG: CPT | Performed by: INTERNAL MEDICINE

## 2022-11-30 PROCEDURE — 93000 ELECTROCARDIOGRAM COMPLETE: CPT | Performed by: INTERNAL MEDICINE

## 2022-11-30 ASSESSMENT — ENCOUNTER SYMPTOMS
SHORTNESS OF BREATH: 0
COUGH: 0
RHINORRHEA: 0
ABDOMINAL PAIN: 0
BLOOD IN STOOL: 0

## 2022-11-30 ASSESSMENT — PATIENT HEALTH QUESTIONNAIRE - PHQ9
SUM OF ALL RESPONSES TO PHQ QUESTIONS 1-9: 0
2. FEELING DOWN, DEPRESSED OR HOPELESS: 0
SUM OF ALL RESPONSES TO PHQ QUESTIONS 1-9: 0
1. LITTLE INTEREST OR PLEASURE IN DOING THINGS: 0
SUM OF ALL RESPONSES TO PHQ QUESTIONS 1-9: 0
SUM OF ALL RESPONSES TO PHQ9 QUESTIONS 1 & 2: 0
SUM OF ALL RESPONSES TO PHQ QUESTIONS 1-9: 0

## 2022-11-30 NOTE — PATIENT INSTRUCTIONS
Recommended low fat, low cholesterol, low carbohydrate diet. Recommended increasing fresh fruits and vegetables in diet, eating lean meats, like fish and poultry, that are baked, broiled or grilled, not fried. Recommended regular exercise, 30 minutes of aerobic activity 4-5 days a week. Recommended monitoring weight closely and keeping follow-up appointments for recheck.

## 2022-11-30 NOTE — PROGRESS NOTES
[facility]      20[facility]    Patient Name: Charlotte Glover  :  1965    Subjective:    Chief Complaint:  Chief Complaint   Patient presents with    Pre-op Exam     Pt is here for pre-op exam for left foot procedure . HPI Here for pre-op evaluation for bunion on L foot; Dr. Barb Canales at [facility] will be doing his surgery 22; He denies history of chest pain, SOB, MORTON; he has history of fist degree AV block, saw Dr. Devin Hernandez, Cardiology, prior to his knee replacement surgery last December; no additional testing was ordered at that time and he was cleared to proceed with surgery;   HTN:  Patient compliant with taking blood pressure medications: Yes  Discussed importance of following low sodium DASH diet, increasing physical activity, taking medications as ordered, decreasing alcohol intake, keeping f/u visits to recheck blood pressure, monitoring blood pressure at home and keeping a log, with goal blood pressure <140/90. Home bp readings are: 130s sbp      Past Medical History:   Diagnosis Date    Glucose intolerance (impaired glucose tolerance)     pt denies this dx    Hyperlipidemia     pt denies this    Hypertension     controlled with med    Left shoulder pain     Morbid obesity ([address]     bmi =44       Past Surgical History:   Procedure Laterality Date    APPENDECTOMY  as child    COLONOSCOPY  2019    due for repeat in     SHOULDER ARTHROSCOPY Left 2018    Dr. Carlos Croft Right 2021       Family History   Problem Relation Age of Onset    Lung Cancer Maternal Grandfather         smoker    Heart Disease Father     Diabetes Maternal Grandmother     Stroke Mother     Heart Attack Mother     Osteoarthritis Mother        Social History     Tobacco Use    Smoking status: Never    Smokeless tobacco: Never   Substance Use Topics    Alcohol use: Yes     Alcohol/week: 3.0 standard drinks    Drug use:  No                 Current Outpatient Medications:     Multiple Vitamin (DAILY-SADE MULTIVITAMIN PO), Take 1 tablet by mouth daily, Disp: , Rfl:     amLODIPine (NORVASC) 10 MG tablet, Take 1 tablet by mouth daily, Disp: 90 tablet, Rfl: 1    hydrALAZINE (APRESOLINE) 50 MG tablet, TAKE 1 TABLET BY MOUTH TWO TIMES A DAY., Disp: 180 tablet, Rfl: 1    losartan-hydroCHLOROthiazide (HYZAAR) 100-25 MG per tablet, Take 1 tablet by mouth daily, Disp: 90 tablet, Rfl: 1    atorvastatin (LIPITOR) 10 MG tablet, Take 1 tablet by mouth daily, Disp: 90 tablet, Rfl: 1    zoster recombinant adjuvanted vaccine (SHINGRIX) 50 MCG/0.5ML SUSR injection, Inject 0.5 mLs into the muscle See Admin Instructions 1 dose now and repeat in 2-6 months, Disp: 0.5 mL, Rfl: 0    No Known Allergies    Review of Systems   Constitutional:  Negative for chills, fatigue and fever. HENT:  Negative for congestion, postnasal drip and rhinorrhea. Eyes:  Negative for visual disturbance. Respiratory:  Negative for cough and shortness of breath. Cardiovascular:  Negative for chest pain and palpitations. Gastrointestinal:  Negative for abdominal pain and blood in stool. Genitourinary:  Negative for dysuria and frequency. Musculoskeletal:  Negative for arthralgias and myalgias. Skin: Negative. Neurological:  Negative for numbness and headaches. Psychiatric/Behavioral:  Negative for dysphoric mood and sleep disturbance. The patient is not nervous/anxious. All other systems reviewed and are negative. Objective:  /70   Pulse 85   Temp 97.9 °F (36.6 °C) (Temporal)   Resp 16   Ht 5' 8\" (1.727 m)   Wt (!) 300 lb 12.8 oz (136.4 kg)   SpO2 99%   BMI 45.74 kg/m²     Examination:  Physical Exam  Vitals reviewed. Constitutional:       Appearance: Normal appearance. He is obese. HENT:      Head: Normocephalic and atraumatic. Nose: Nose normal.      Mouth/Throat:      Mouth: Mucous membranes are moist.      Pharynx: Oropharynx is clear.    Eyes:      Extraocular Movements: Extraocular movements intact. Conjunctiva/sclera: Conjunctivae normal.      Pupils: Pupils are equal, round, and reactive to light. Cardiovascular:      Rate and Rhythm: Normal rate and regular rhythm. Pulses: Normal pulses. Heart sounds: Normal heart sounds. Pulmonary:      Effort: Pulmonary effort is normal.      Breath sounds: Normal breath sounds. Abdominal:      General: Abdomen is flat. Bowel sounds are normal.      Palpations: Abdomen is soft. Musculoskeletal:         General: Normal range of motion. Cervical back: Normal range of motion and neck supple. Skin:     General: Skin is warm and dry. Neurological:      General: No focal deficit present. Mental Status: He is alert and oriented to person, place, and time. Mental status is at baseline. Psychiatric:         Mood and Affect: Mood normal.         Behavior: Behavior normal.         Assessment/Plan:    Betsy Webb was seen today for pre-op exam.    Diagnoses and all orders for this visit:    Preop examination  Mr. Joseph Bedoya was seen today for pre-op evaluation in preparation of L foot bunion surgery, per Dr. Levar Gracia. Surgery is scheduled for 12/14/2022. He has a PMH significant for HTN, which is well controlled on present medication. He has hyperlipidemia, prediabetes and obesity. He denies history of CP, SOB, MORTON. Blood pressure was 132/70 today, vital signs otherwise stable. EKG revealed sinus rhythm, hr 80, first degree AV block. This is similar to prior EKG in 2021. He did see Cardiology last year prior to knee replacement surgery, and was cleared to proceed at that time. He does not have any history of chest pain or other concerning symptoms, and may proceed with bunion surgery as planned. He would benefit from early ambulation, DVT prophylaxis. If I can be of any further assistance regarding Mr. Joseph's medical history, please feel free to contact our office.    -     EKG 12 Lead; Future  -     EKG 12 Lead    Mixed hyperlipidemia  -     Cancel: Urinalysis    Primary hypertension  Stable, continue medication, diet. Prediabetes    Class 3 severe obesity due to excess calories with serious comorbidity and body mass index (BMI) of 40.0 to 44.9 in adult [facility])  Recommended low fat, low cholesterol, low carbohydrate diet. Recommended increasing fresh fruits and vegetables in diet, eating lean meats, like fish and poultry, that are baked, broiled or grilled, not fried. Recommended regular exercise, 30 minutes of aerobic activity 4-5 days a week. Recommended monitoring weight closely and keeping follow-up appointments for recheck. Follow-up and Dispositions    Return if symptoms worsen or fail to improve, for keep f/u w/ labs in January. Medication Reconciliation:  Current Medications Verified: Current medications/ immunizations were reviewed, including purpose, with patient. Family History, Social History, Current and Past Medical History was reviewed with patient and updated at today's office visit. Medication Reconciliation list was given to patient/ family. Patient was advised to discard old medication lists and provide all providers with current list at each visit and carry list with them in case of emergency.     Completed By:   Jacob He MD    [facility]  [address] [place] [address], [facility] Katty Renteria, [address]  [phone]  [phone] fax  3:42 PM

## 2022-12-27 DIAGNOSIS — E78.2 MIXED HYPERLIPIDEMIA: ICD-10-CM

## 2022-12-27 DIAGNOSIS — I10 PRIMARY HYPERTENSION: ICD-10-CM

## 2022-12-27 NOTE — TELEPHONE ENCOUNTER
Pt called, requests refill of atorvastatin, hydralazine, losartan, amlodipine    [facility] [address]    [phone]

## 2022-12-29 RX ORDER — HYDRALAZINE HYDROCHLORIDE 50 MG/1
TABLET, FILM COATED ORAL
Qty: 180 TABLET | Refills: 0 | Status: SHIPPED | OUTPATIENT
Start: 2022-12-29

## 2022-12-29 RX ORDER — AMLODIPINE BESYLATE 10 MG/1
10 TABLET ORAL DAILY
Qty: 90 TABLET | Refills: 0 | Status: SHIPPED | OUTPATIENT
Start: 2022-12-29

## 2022-12-29 RX ORDER — LOSARTAN POTASSIUM AND HYDROCHLOROTHIAZIDE 25; 100 MG/1; MG/1
1 TABLET ORAL DAILY
Qty: 90 TABLET | Refills: 0 | Status: SHIPPED | OUTPATIENT
Start: 2022-12-29

## 2022-12-29 RX ORDER — ATORVASTATIN CALCIUM 10 MG/1
10 TABLET, FILM COATED ORAL DAILY
Qty: 90 TABLET | Refills: 0 | Status: SHIPPED | OUTPATIENT
Start: 2022-12-29

## 2022-12-29 RX ORDER — LOSARTAN POTASSIUM AND HYDROCHLOROTHIAZIDE 25; 100 MG/1; MG/1
TABLET ORAL
Qty: 30 TABLET | Refills: 5 | OUTPATIENT
Start: 2022-12-29

## 2023-06-29 ENCOUNTER — TELEPHONE (OUTPATIENT)
Dept: INTERNAL MEDICINE CLINIC | Facility: CLINIC | Age: 58
End: 2023-06-29

## 2023-07-03 DIAGNOSIS — I10 PRIMARY HYPERTENSION: ICD-10-CM

## 2023-07-03 NOTE — TELEPHONE ENCOUNTER
Pt called, requesting refill of his hbp medication. As pt hasn't been seen in office for nearly a year, he would need labs and an appt. Pt declined appt, requ call back from MA.

## 2023-07-05 RX ORDER — AMLODIPINE BESYLATE 10 MG/1
10 TABLET ORAL DAILY
Qty: 30 TABLET | Refills: 0 | Status: SHIPPED | OUTPATIENT
Start: 2023-07-05

## 2023-07-05 RX ORDER — LOSARTAN POTASSIUM AND HYDROCHLOROTHIAZIDE 25; 100 MG/1; MG/1
1 TABLET ORAL DAILY
Qty: 30 TABLET | Refills: 0 | Status: SHIPPED | OUTPATIENT
Start: 2023-07-05

## 2023-07-05 RX ORDER — HYDRALAZINE HYDROCHLORIDE 50 MG/1
TABLET, FILM COATED ORAL
Qty: 60 TABLET | Refills: 0 | Status: SHIPPED | OUTPATIENT
Start: 2023-07-05

## 2023-07-05 NOTE — TELEPHONE ENCOUNTER
Pt is needing a refill on blood pressure meds. LMOVM informing pt I will have Dr. Veronica Campos send in a refill; however, it will only be for 30 days and he needs to call the office to schedule labs and follow up.

## 2023-07-26 DIAGNOSIS — R35.1 NOCTURIA: ICD-10-CM

## 2023-07-26 DIAGNOSIS — E78.2 MIXED HYPERLIPIDEMIA: ICD-10-CM

## 2023-07-26 DIAGNOSIS — I10 PRIMARY HYPERTENSION: Primary | ICD-10-CM

## 2023-07-26 DIAGNOSIS — R73.03 PREDIABETES: ICD-10-CM

## 2023-07-27 ENCOUNTER — HOSPITAL ENCOUNTER (OUTPATIENT)
Dept: LAB | Age: 58
Discharge: HOME | End: 2023-07-30

## 2023-07-27 DIAGNOSIS — I10 PRIMARY HYPERTENSION: ICD-10-CM

## 2023-07-27 DIAGNOSIS — R73.03 PREDIABETES: ICD-10-CM

## 2023-07-27 DIAGNOSIS — E78.2 MIXED HYPERLIPIDEMIA: ICD-10-CM

## 2023-07-27 DIAGNOSIS — R35.1 NOCTURIA: ICD-10-CM

## 2023-07-27 LAB
ALBUMIN SERPL-MCNC: 4.1 G/DL (ref 3.5–5)
ALBUMIN/GLOB SERPL: 1.1 (ref 0.4–1.6)
ALP SERPL-CCNC: 55 U/L (ref 50–136)
ALT SERPL-CCNC: 41 U/L (ref 12–65)
ANION GAP SERPL CALC-SCNC: 8 MMOL/L (ref 2–11)
AST SERPL-CCNC: 32 U/L (ref 15–37)
BASOPHILS # BLD: 0.1 K/UL (ref 0–0.2)
BASOPHILS NFR BLD: 1 % (ref 0–2)
BILIRUB SERPL-MCNC: 0.4 MG/DL (ref 0.2–1.1)
BUN SERPL-MCNC: 22 MG/DL (ref 6–23)
CALCIUM SERPL-MCNC: 9.5 MG/DL (ref 8.3–10.4)
CHLORIDE SERPL-SCNC: 108 MMOL/L (ref 101–110)
CHOLEST SERPL-MCNC: 237 MG/DL
CO2 SERPL-SCNC: 26 MMOL/L (ref 21–32)
CREAT SERPL-MCNC: 1 MG/DL (ref 0.8–1.5)
DIFFERENTIAL METHOD BLD: ABNORMAL
EOSINOPHIL # BLD: 0.2 K/UL (ref 0–0.8)
EOSINOPHIL NFR BLD: 2 % (ref 0.5–7.8)
ERYTHROCYTE [DISTWIDTH] IN BLOOD BY AUTOMATED COUNT: 16 % (ref 11.9–14.6)
GLOBULIN SER CALC-MCNC: 3.9 G/DL (ref 2.8–4.5)
GLUCOSE SERPL-MCNC: 97 MG/DL (ref 65–100)
HCT VFR BLD AUTO: 39.9 % (ref 41.1–50.3)
HDLC SERPL-MCNC: 42 MG/DL (ref 40–60)
HDLC SERPL: 5.6
HGB BLD-MCNC: 12.8 G/DL (ref 13.6–17.2)
IMM GRANULOCYTES # BLD AUTO: 0 K/UL (ref 0–0.5)
IMM GRANULOCYTES NFR BLD AUTO: 0 % (ref 0–5)
LDLC SERPL CALC-MCNC: 168.6 MG/DL
LYMPHOCYTES # BLD: 3.8 K/UL (ref 0.5–4.6)
LYMPHOCYTES NFR BLD: 54 % (ref 13–44)
MCH RBC QN AUTO: 25.5 PG (ref 26.1–32.9)
MCHC RBC AUTO-ENTMCNC: 32.1 G/DL (ref 31.4–35)
MCV RBC AUTO: 79.6 FL (ref 82–102)
MONOCYTES # BLD: 0.7 K/UL (ref 0.1–1.3)
MONOCYTES NFR BLD: 10 % (ref 4–12)
NEUTS SEG # BLD: 2.3 K/UL (ref 1.7–8.2)
NEUTS SEG NFR BLD: 33 % (ref 43–78)
NRBC # BLD: 0 K/UL (ref 0–0.2)
PLATELET # BLD AUTO: 344 K/UL (ref 150–450)
PMV BLD AUTO: 9.5 FL (ref 9.4–12.3)
POTASSIUM SERPL-SCNC: 4.2 MMOL/L (ref 3.5–5.1)
PROT SERPL-MCNC: 8 G/DL (ref 6.3–8.2)
RBC # BLD AUTO: 5.01 M/UL (ref 4.23–5.6)
SODIUM SERPL-SCNC: 142 MMOL/L (ref 133–143)
T4 FREE SERPL-MCNC: 1 NG/DL (ref 0.78–1.46)
TRIGL SERPL-MCNC: 132 MG/DL (ref 35–150)
TSH, 3RD GENERATION: 1.01 UIU/ML (ref 0.36–3.74)
VLDLC SERPL CALC-MCNC: 26.4 MG/DL (ref 6–23)
WBC # BLD AUTO: 7 K/UL (ref 4.3–11.1)

## 2023-07-28 LAB
EST. AVERAGE GLUCOSE BLD GHB EST-MCNC: 134 MG/DL
HBA1C MFR BLD: 6.3 % (ref 4.8–5.6)
PSA FREE MFR SERPL: 20 %
PSA FREE SERPL-MCNC: 1.1 NG/ML
PSA SERPL-MCNC: 5.5 NG/ML

## 2023-08-04 DIAGNOSIS — I10 PRIMARY HYPERTENSION: ICD-10-CM

## 2023-08-04 RX ORDER — HYDRALAZINE HYDROCHLORIDE 50 MG/1
TABLET, FILM COATED ORAL
Qty: 60 TABLET | Refills: 0 | OUTPATIENT
Start: 2023-08-04

## 2023-08-04 RX ORDER — LOSARTAN POTASSIUM AND HYDROCHLOROTHIAZIDE 25; 100 MG/1; MG/1
TABLET ORAL
Qty: 30 TABLET | Refills: 0 | OUTPATIENT
Start: 2023-08-04

## 2023-08-12 DIAGNOSIS — I10 PRIMARY HYPERTENSION: ICD-10-CM

## 2023-08-13 SDOH — ECONOMIC STABILITY: FOOD INSECURITY: WITHIN THE PAST 12 MONTHS, YOU WORRIED THAT YOUR FOOD WOULD RUN OUT BEFORE YOU GOT MONEY TO BUY MORE.: NEVER TRUE

## 2023-08-13 SDOH — ECONOMIC STABILITY: TRANSPORTATION INSECURITY
IN THE PAST 12 MONTHS, HAS LACK OF TRANSPORTATION KEPT YOU FROM MEETINGS, WORK, OR FROM GETTING THINGS NEEDED FOR DAILY LIVING?: NO

## 2023-08-13 SDOH — ECONOMIC STABILITY: INCOME INSECURITY: HOW HARD IS IT FOR YOU TO PAY FOR THE VERY BASICS LIKE FOOD, HOUSING, MEDICAL CARE, AND HEATING?: NOT HARD AT ALL

## 2023-08-13 SDOH — ECONOMIC STABILITY: FOOD INSECURITY: WITHIN THE PAST 12 MONTHS, THE FOOD YOU BOUGHT JUST DIDN'T LAST AND YOU DIDN'T HAVE MONEY TO GET MORE.: NEVER TRUE

## 2023-08-13 SDOH — ECONOMIC STABILITY: HOUSING INSECURITY
IN THE LAST 12 MONTHS, WAS THERE A TIME WHEN YOU DID NOT HAVE A STEADY PLACE TO SLEEP OR SLEPT IN A SHELTER (INCLUDING NOW)?: NO

## 2023-08-13 ASSESSMENT — PATIENT HEALTH QUESTIONNAIRE - PHQ9
SUM OF ALL RESPONSES TO PHQ QUESTIONS 1-9: 1
SUM OF ALL RESPONSES TO PHQ9 QUESTIONS 1 & 2: 1
1. LITTLE INTEREST OR PLEASURE IN DOING THINGS: 1
SUM OF ALL RESPONSES TO PHQ QUESTIONS 1-9: 1
SUM OF ALL RESPONSES TO PHQ QUESTIONS 1-9: 1
2. FEELING DOWN, DEPRESSED OR HOPELESS: 0
SUM OF ALL RESPONSES TO PHQ QUESTIONS 1-9: 1

## 2023-08-14 RX ORDER — AMLODIPINE BESYLATE 10 MG/1
TABLET ORAL
Qty: 30 TABLET | Refills: 0 | OUTPATIENT
Start: 2023-08-14

## 2023-08-15 DIAGNOSIS — I10 PRIMARY HYPERTENSION: ICD-10-CM

## 2023-08-15 RX ORDER — LOSARTAN POTASSIUM AND HYDROCHLOROTHIAZIDE 25; 100 MG/1; MG/1
1 TABLET ORAL DAILY
Qty: 30 TABLET | Refills: 0 | Status: SHIPPED | OUTPATIENT
Start: 2023-08-15

## 2023-08-15 ASSESSMENT — PATIENT HEALTH QUESTIONNAIRE - PHQ9
SUM OF ALL RESPONSES TO PHQ9 QUESTIONS 1 & 2: 1
2. FEELING DOWN, DEPRESSED OR HOPELESS: NOT AT ALL
1. LITTLE INTEREST OR PLEASURE IN DOING THINGS: SEVERAL DAYS

## 2023-08-15 NOTE — TELEPHONE ENCOUNTER
Patient came in 10 minutes late for appointment.   I rescheduled appt for 9/8- he needs more blood pressure medication to get him through

## 2023-08-31 ENCOUNTER — TELEPHONE (OUTPATIENT)
Dept: INTERNAL MEDICINE CLINIC | Facility: CLINIC | Age: 58
End: 2023-08-31

## 2023-09-08 ENCOUNTER — OFFICE VISIT (OUTPATIENT)
Dept: INTERNAL MEDICINE CLINIC | Facility: CLINIC | Age: 58
End: 2023-09-08
Payer: COMMERCIAL

## 2023-09-08 VITALS
OXYGEN SATURATION: 98 % | SYSTOLIC BLOOD PRESSURE: 122 MMHG | WEIGHT: 315 LBS | HEART RATE: 81 BPM | BODY MASS INDEX: 47.74 KG/M2 | DIASTOLIC BLOOD PRESSURE: 82 MMHG | TEMPERATURE: 98.2 F | HEIGHT: 68 IN

## 2023-09-08 DIAGNOSIS — I10 PRIMARY HYPERTENSION: ICD-10-CM

## 2023-09-08 DIAGNOSIS — Z00.00 PHYSICAL EXAM: Primary | ICD-10-CM

## 2023-09-08 DIAGNOSIS — R73.03 PREDIABETES: ICD-10-CM

## 2023-09-08 DIAGNOSIS — E78.2 MIXED HYPERLIPIDEMIA: ICD-10-CM

## 2023-09-08 PROCEDURE — 3074F SYST BP LT 130 MM HG: CPT | Performed by: NURSE PRACTITIONER

## 2023-09-08 PROCEDURE — 99214 OFFICE O/P EST MOD 30 MIN: CPT | Performed by: NURSE PRACTITIONER

## 2023-09-08 PROCEDURE — 3078F DIAST BP <80 MM HG: CPT | Performed by: NURSE PRACTITIONER

## 2023-09-08 RX ORDER — HYDRALAZINE HYDROCHLORIDE 50 MG/1
TABLET, FILM COATED ORAL
Qty: 60 TABLET | Refills: 0 | Status: SHIPPED | OUTPATIENT
Start: 2023-09-08

## 2023-09-08 RX ORDER — LOSARTAN POTASSIUM AND HYDROCHLOROTHIAZIDE 25; 100 MG/1; MG/1
1 TABLET ORAL DAILY
Qty: 30 TABLET | Refills: 0 | Status: SHIPPED | OUTPATIENT
Start: 2023-09-08

## 2023-09-08 RX ORDER — AMLODIPINE BESYLATE 10 MG/1
10 TABLET ORAL DAILY
Qty: 30 TABLET | Refills: 0 | Status: SHIPPED | OUTPATIENT
Start: 2023-09-08

## 2023-09-08 RX ORDER — ATORVASTATIN CALCIUM 10 MG/1
10 TABLET, FILM COATED ORAL DAILY
Qty: 90 TABLET | Refills: 0 | Status: SHIPPED | OUTPATIENT
Start: 2023-09-08

## 2023-09-08 RX ORDER — LOSARTAN POTASSIUM AND HYDROCHLOROTHIAZIDE 25; 100 MG/1; MG/1
TABLET ORAL
Qty: 30 TABLET | Refills: 0 | OUTPATIENT
Start: 2023-09-08

## 2023-09-08 NOTE — PROGRESS NOTES
@[email or web address]@  [facility] [address] [phone]      PROGRESS NOTE    SUBJECTIVE:   Alejandro Pyle is a 66 y.o. male seen for a follow up visit regarding the following chief complaint:     Chief Complaint   Patient presents with    Annual Exam    Medication Refill       HPI  Patient presents for CPE. Labs reviewed today. HL: Uncontrolled with diet regimen; he does not take any prescription for this currently. HTN: he is compliant with current treatment regimen, denies any intolerance. he denies any headache, dizziness or edema. A1c up to 6.3%; this is diet controlled; some indiscretions. Iron deficiency anemia stable;  PSA slightly elevated today at 5.5; Following Dr. Dennis Fiore for management of this. Current Outpatient Medications   Medication Sig Dispense Refill    hydrALAZINE (APRESOLINE) 50 MG tablet TAKE 1 TABLET BY MOUTH TWO TIMES A DAY. 60 tablet 0    losartan-hydroCHLOROthiazide (HYZAAR) 100-25 MG per tablet Take 1 tablet by mouth daily 30 tablet 0    amLODIPine (NORVASC) 10 MG tablet Take 1 tablet by mouth daily 30 tablet 0    atorvastatin (LIPITOR) 10 MG tablet Take 1 tablet by mouth daily 90 tablet 0    Multiple Vitamin (DAILY-SADE MULTIVITAMIN PO) Take 1 tablet by mouth daily       No current facility-administered medications for this visit.      No Known Allergies    Past Medical History:   Diagnosis Date    Glucose intolerance (impaired glucose tolerance)     pt denies this dx    Hyperlipidemia     pt denies this    Hypertension     controlled with med    Left shoulder pain     Morbid obesity ([address])     bmi =44     Past Surgical History:   Procedure Laterality Date    APPENDECTOMY  as child    COLONOSCOPY  12/2019    due for repeat in 2024    SHOULDER ARTHROSCOPY Left 07/2018    Dr. Romel Weaver Right 12/2021     Family History   Problem Relation Age of Onset    Lung Cancer Maternal Grandfather         smoker    Heart Disease Father     Diabetes Maternal

## 2023-09-12 DIAGNOSIS — R97.20 ELEVATED PSA: Primary | ICD-10-CM

## 2023-09-13 ASSESSMENT — ENCOUNTER SYMPTOMS
COLOR CHANGE: 0
ABDOMINAL PAIN: 0
ABDOMINAL DISTENTION: 0
NAUSEA: 0
CONSTIPATION: 0
BACK PAIN: 0
SINUS PAIN: 0
EYE REDNESS: 0
EYE DISCHARGE: 0
COUGH: 0
DIARRHEA: 0
EYE PAIN: 0
APNEA: 0
EYE ITCHING: 0
SHORTNESS OF BREATH: 0

## 2023-10-02 DIAGNOSIS — I10 PRIMARY HYPERTENSION: ICD-10-CM

## 2023-10-02 RX ORDER — HYDRALAZINE HYDROCHLORIDE 50 MG/1
TABLET, FILM COATED ORAL
Qty: 60 TABLET | Refills: 0 | OUTPATIENT
Start: 2023-10-02

## 2023-10-03 ENCOUNTER — HOSPITAL ENCOUNTER (OUTPATIENT)
Dept: MRI IMAGING | Age: 58
Discharge: HOME | End: 2023-10-06
Attending: UROLOGY
Payer: COMMERCIAL

## 2023-10-03 DIAGNOSIS — R97.20 ELEVATED PSA: ICD-10-CM

## 2023-10-03 PROCEDURE — 6360000004 HC RX CONTRAST MEDICATION: Performed by: UROLOGY

## 2023-10-03 PROCEDURE — 2580000003 HC RX 258: Performed by: UROLOGY

## 2023-10-03 PROCEDURE — A9579 GAD-BASE MR CONTRAST NOS,1ML: HCPCS | Performed by: UROLOGY

## 2023-10-03 PROCEDURE — 72197 MRI PELVIS W/O & W/DYE: CPT

## 2023-10-03 RX ORDER — SODIUM CHLORIDE 0.9 % (FLUSH) 0.9 %
30 SYRINGE (ML) INJECTION AS NEEDED
Status: DISCONTINUED | OUTPATIENT
Start: 2023-10-03 | End: 2023-10-07 | Stop reason: HOSPADM

## 2023-10-03 RX ADMIN — GADOTERIDOL 30 ML: 279.3 INJECTION, SOLUTION INTRAVENOUS at 21:15

## 2023-10-03 RX ADMIN — SODIUM CHLORIDE, PRESERVATIVE FREE 30 ML: 5 INJECTION INTRAVENOUS at 21:15

## 2023-10-07 DIAGNOSIS — I10 PRIMARY HYPERTENSION: ICD-10-CM

## 2023-10-09 RX ORDER — AMLODIPINE BESYLATE 10 MG/1
10 TABLET ORAL DAILY
Qty: 30 TABLET | Refills: 0 | OUTPATIENT
Start: 2023-10-09

## 2023-10-12 ENCOUNTER — OFFICE VISIT (OUTPATIENT)
Dept: UROLOGY | Age: 58
End: 2023-10-12
Payer: COMMERCIAL

## 2023-10-12 ENCOUNTER — TELEPHONE (OUTPATIENT)
Dept: UROLOGY | Age: 58
End: 2023-10-12

## 2023-10-12 DIAGNOSIS — R97.20 ELEVATED PSA: Primary | ICD-10-CM

## 2023-10-12 DIAGNOSIS — N40.1 BENIGN PROSTATIC HYPERPLASIA WITH LOWER URINARY TRACT SYMPTOMS, SYMPTOM DETAILS UNSPECIFIED: ICD-10-CM

## 2023-10-12 DIAGNOSIS — R97.20 ELEVATED PROSTATE SPECIFIC ANTIGEN (PSA): ICD-10-CM

## 2023-10-12 PROCEDURE — 99214 OFFICE O/P EST MOD 30 MIN: CPT | Performed by: UROLOGY

## 2023-10-12 RX ORDER — TAMSULOSIN HYDROCHLORIDE 0.4 MG/1
0.4 CAPSULE ORAL EVERY EVENING
Qty: 90 CAPSULE | Refills: 3 | Status: SHIPPED | OUTPATIENT
Start: 2023-10-12

## 2023-10-12 RX ORDER — CIPROFLOXACIN 500 MG/1
500 TABLET, FILM COATED ORAL 2 TIMES DAILY
Qty: 6 TABLET | Refills: 0 | Status: SHIPPED | OUTPATIENT
Start: 2023-10-12 | End: 2023-10-15

## 2023-10-12 NOTE — TELEPHONE ENCOUNTER
----- Message from Jermaine Tinoco DO sent at 10/12/2023  4:03 PM EDT -----  Regarding: surg  MRI fusion prostate biopsies  30min  Ua day of  Rocephin 1g IV preop  Outpt  Mac  I sent cipro

## 2023-10-12 NOTE — PROGRESS NOTES
[facility]  [address] [address]  [phone]    Riccardo Valladares  : 1965     HPI   66 y.o., male returns in follow up for an elevated PSA. PSA was 2.7 on 18; 6.3 on 19; 4.7 (17% free) on 2019; 3.5 (16% free on 2019; 4.4 on 21; 2.9 on 21; 3.9 on 10/28/21; 5.2 on 22 and 5.5 (20% free) on 23. MRI on 10/4/23 showed pirads 3 lesions that RM and RB gland. No prior bx or FH of CaP. Denies ED. Reports increased nocturia up to 4-5x per night. Past Medical History:   Diagnosis Date    Glucose intolerance (impaired glucose tolerance)     pt denies this dx    Hyperlipidemia     pt denies this    Hypertension     controlled with med    Left shoulder pain     Morbid obesity ([address])     bmi =44     Past Surgical History:   Procedure Laterality Date    APPENDECTOMY  as child    COLONOSCOPY  2019    due for repeat in     SHOULDER ARTHROSCOPY Left 2018    Dr. De Paz Goes Right 2021     Current Outpatient Medications   Medication Sig Dispense Refill    ciprofloxacin (CIPRO) 500 MG tablet Take 1 tablet by mouth 2 times daily for 3 days Start day prior to biopsy 6 tablet 0    tamsulosin (FLOMAX) 0.4 MG capsule Take 1 capsule by mouth every evening 90 capsule 3    hydrALAZINE (APRESOLINE) 50 MG tablet TAKE 1 TABLET BY MOUTH TWO TIMES A DAY. 60 tablet 0    losartan-hydroCHLOROthiazide (HYZAAR) 100-25 MG per tablet Take 1 tablet by mouth daily 30 tablet 0    amLODIPine (NORVASC) 10 MG tablet Take 1 tablet by mouth daily 30 tablet 0    atorvastatin (LIPITOR) 10 MG tablet Take 1 tablet by mouth daily 90 tablet 0    Multiple Vitamin (DAILY-SADE MULTIVITAMIN PO) Take 1 tablet by mouth daily       No current facility-administered medications for this visit.      No Known Allergies  Social History     Socioeconomic History    Marital status:      Spouse name: Not on file    Number of children: Not on file    Years of

## 2023-10-13 DIAGNOSIS — I10 PRIMARY HYPERTENSION: ICD-10-CM

## 2023-10-13 RX ORDER — LOSARTAN POTASSIUM AND HYDROCHLOROTHIAZIDE 25; 100 MG/1; MG/1
1 TABLET ORAL DAILY
Qty: 30 TABLET | Refills: 0 | OUTPATIENT
Start: 2023-10-13

## 2023-10-18 PROBLEM — R97.20 ELEVATED PROSTATE SPECIFIC ANTIGEN (PSA): Status: ACTIVE | Noted: 2023-10-12

## 2023-10-18 NOTE — TELEPHONE ENCOUNTER
Procedures: Procedure(s):   PROSTATE BIOPSY FUSION   Date: 11/15/2023   Time: 1500   Location: [facility] MAIN OR

## 2023-10-30 DIAGNOSIS — E78.2 MIXED HYPERLIPIDEMIA: ICD-10-CM

## 2023-10-30 DIAGNOSIS — I10 PRIMARY HYPERTENSION: ICD-10-CM

## 2023-10-30 NOTE — TELEPHONE ENCOUNTER
Pt is requesting a refill of losartan, atorvastatin, amlodipine, and hydralazine. [facility] [address]

## 2023-11-01 RX ORDER — AMLODIPINE BESYLATE 10 MG/1
10 TABLET ORAL DAILY
Qty: 30 TABLET | Refills: 0 | Status: SHIPPED | OUTPATIENT
Start: 2023-11-01

## 2023-11-01 RX ORDER — HYDRALAZINE HYDROCHLORIDE 50 MG/1
TABLET, FILM COATED ORAL
Qty: 60 TABLET | Refills: 0 | Status: SHIPPED | OUTPATIENT
Start: 2023-11-01

## 2023-11-01 RX ORDER — ATORVASTATIN CALCIUM 10 MG/1
10 TABLET, FILM COATED ORAL DAILY
Qty: 90 TABLET | Refills: 0 | Status: SHIPPED | OUTPATIENT
Start: 2023-11-01

## 2023-11-14 ENCOUNTER — ANESTHESIA EVENT (OUTPATIENT)
Dept: SURGERY | Age: 58
End: 2023-11-14
Payer: COMMERCIAL

## 2023-11-14 RX ORDER — ACETAMINOPHEN 500 MG
1000 TABLET ORAL 2 TIMES DAILY
COMMUNITY

## 2023-11-14 RX ORDER — CIPROFLOXACIN 500 MG/1
500 TABLET, FILM COATED ORAL 2 TIMES DAILY
COMMUNITY

## 2023-11-14 NOTE — PERIOP NOTE
Phone pre-assessment completed. Verified name&  . Order to obtain consent  found in EHR &  matches case posting. Type 1A surgery,  assessment complete. Orders  received. Labs per surgeon: UA on arrival  Labs per anesthesia protocol: none      Patient answered medical/surgical history questions at their best of ability. All prior to admission medications documented in EPIC. Patient instructed to take ONLY the following medications the day of surgery according to anesthesia guidelines with a small sip of water: amlodipine, atorvastatin, Ciprofloxacin and Hydralazine. Pt verbalizes understanding NOT to take  losartan DOS. If you have never been diagnosed with liver disease, take Acetaminophen 1000mg in the morning and then again before bed one day prior to surgery date. VERBALIZES UNDERSTANDING TO HOLD ALL VITAMINS AND SUPPLEMENTS and NSAIDS (aspirin, naproxen, ibuprofen) IMMEDIATELY PER ANESTHESIA PROTOCOL. Instructed on the following:    > Arrive at [address] [facility], [address] [facility] (enter at front entrance by statue of [address])  >Time of arrival to be called the day before by 1700  > NPO after midnight including gum, mints, and ice chips  > Responsible adult must drive patient to the hospital, stay during surgery, and patient will need supervision 24 hours after anesthesia  > Use antibacterial soap in shower the night before surgery and on the morning of surgery  > All piercings must be removed prior to arrival.    > Leave all valuables (money and jewelry) at home but bring insurance card and ID on DOS.   > You may be required to pay a deductible or co-pay on the day of your procedure. You can pre-pay by calling [phone] if your surgery is at the [facility] or [phone] if your surgery is at the [facility]. > Do not wear make-up, nail polish, lotions, cologne, perfumes, powders, or oil on skin. Artificial nails are not permitted.    Please bring the following that apply: CPAP

## 2023-11-15 ENCOUNTER — HOSPITAL ENCOUNTER (OUTPATIENT)
Age: 58
Setting detail: OUTPATIENT SURGERY
Discharge: HOME | End: 2023-11-15
Attending: UROLOGY | Admitting: UROLOGY
Payer: COMMERCIAL

## 2023-11-15 ENCOUNTER — ANESTHESIA (OUTPATIENT)
Dept: SURGERY | Age: 58
End: 2023-11-15
Payer: COMMERCIAL

## 2023-11-15 VITALS
WEIGHT: 315 LBS | HEIGHT: 68 IN | DIASTOLIC BLOOD PRESSURE: 87 MMHG | HEART RATE: 84 BPM | TEMPERATURE: 97.6 F | OXYGEN SATURATION: 97 % | SYSTOLIC BLOOD PRESSURE: 154 MMHG | BODY MASS INDEX: 47.74 KG/M2 | RESPIRATION RATE: 14 BRPM

## 2023-11-15 LAB
APPEARANCE UR: CLEAR
BACTERIA URNS QL MICRO: NEGATIVE /HPF
BILIRUB UR QL: NEGATIVE
CASTS URNS QL MICRO: ABNORMAL /LPF
COLOR UR: ABNORMAL
EPI CELLS #/AREA URNS HPF: ABNORMAL /HPF
GLUCOSE UR STRIP.AUTO-MCNC: NEGATIVE MG/DL
HGB UR QL STRIP: ABNORMAL
KETONES UR QL STRIP.AUTO: ABNORMAL MG/DL
LEUKOCYTE ESTERASE UR QL STRIP.AUTO: NEGATIVE
NITRITE UR QL STRIP.AUTO: NEGATIVE
PH UR STRIP: 6.5 (ref 5–9)
POTASSIUM BLD-SCNC: 3.9 MMOL/L (ref 3.5–5.1)
PROT UR STRIP-MCNC: 100 MG/DL
RBC #/AREA URNS HPF: >100 /HPF
SP GR UR REFRACTOMETRY: 1.02 (ref 1–1.02)
UROBILINOGEN UR QL STRIP.AUTO: 1 EU/DL (ref 0.2–1)
WBC URNS QL MICRO: ABNORMAL /HPF

## 2023-11-15 PROCEDURE — 7100000000 HC PACU RECOVERY - FIRST 15 MIN: Performed by: UROLOGY

## 2023-11-15 PROCEDURE — 3700000001 HC ADD 15 MINUTES (ANESTHESIA): Performed by: UROLOGY

## 2023-11-15 PROCEDURE — 7100000001 HC PACU RECOVERY - ADDTL 15 MIN: Performed by: UROLOGY

## 2023-11-15 PROCEDURE — 84132 ASSAY OF SERUM POTASSIUM: CPT

## 2023-11-15 PROCEDURE — 3600000002 HC SURGERY LEVEL 2 BASE: Performed by: UROLOGY

## 2023-11-15 PROCEDURE — 3700000000 HC ANESTHESIA ATTENDED CARE: Performed by: UROLOGY

## 2023-11-15 PROCEDURE — 88305 TISSUE EXAM BY PATHOLOGIST: CPT

## 2023-11-15 PROCEDURE — 2580000003 HC RX 258: Performed by: ANESTHESIOLOGY

## 2023-11-15 PROCEDURE — 76872 US TRANSRECTAL: CPT | Performed by: UROLOGY

## 2023-11-15 PROCEDURE — 2580000003 HC RX 258: Performed by: UROLOGY

## 2023-11-15 PROCEDURE — 55700 PR PROSTATE NEEDLE BIOPSY ANY APPROACH: CPT | Performed by: UROLOGY

## 2023-11-15 PROCEDURE — 6360000002 HC RX W HCPCS: Performed by: NURSE ANESTHETIST, CERTIFIED REGISTERED

## 2023-11-15 PROCEDURE — 3600000012 HC SURGERY LEVEL 2 ADDTL 15MIN: Performed by: UROLOGY

## 2023-11-15 PROCEDURE — 6370000000 HC RX 637 (ALT 250 FOR IP): Performed by: ANESTHESIOLOGY

## 2023-11-15 PROCEDURE — 7100000010 HC PHASE II RECOVERY - FIRST 15 MIN: Performed by: UROLOGY

## 2023-11-15 PROCEDURE — 2500000003 HC RX 250 WO HCPCS: Performed by: NURSE ANESTHETIST, CERTIFIED REGISTERED

## 2023-11-15 PROCEDURE — 6360000002 HC RX W HCPCS: Performed by: UROLOGY

## 2023-11-15 PROCEDURE — 81001 URINALYSIS AUTO W/SCOPE: CPT

## 2023-11-15 PROCEDURE — 2709999900 HC NON-CHARGEABLE SUPPLY: Performed by: UROLOGY

## 2023-11-15 RX ORDER — SODIUM CHLORIDE 0.9 % (FLUSH) 0.9 %
5-40 SYRINGE (ML) INJECTION EVERY 12 HOURS SCHEDULED
Status: DISCONTINUED | OUTPATIENT
Start: 2023-11-15 | End: 2023-11-15 | Stop reason: HOSPADM

## 2023-11-15 RX ORDER — HALOPERIDOL 5 MG/ML
1 INJECTION INTRAMUSCULAR
Status: DISCONTINUED | OUTPATIENT
Start: 2023-11-15 | End: 2023-11-15 | Stop reason: HOSPADM

## 2023-11-15 RX ORDER — SODIUM CHLORIDE 0.9 % (FLUSH) 0.9 %
5-40 SYRINGE (ML) INJECTION PRN
Status: DISCONTINUED | OUTPATIENT
Start: 2023-11-15 | End: 2023-11-15 | Stop reason: HOSPADM

## 2023-11-15 RX ORDER — SODIUM CHLORIDE, SODIUM LACTATE, POTASSIUM CHLORIDE, CALCIUM CHLORIDE 600; 310; 30; 20 MG/100ML; MG/100ML; MG/100ML; MG/100ML
INJECTION, SOLUTION INTRAVENOUS CONTINUOUS
Status: DISCONTINUED | OUTPATIENT
Start: 2023-11-15 | End: 2023-11-15 | Stop reason: HOSPADM

## 2023-11-15 RX ORDER — PROPOFOL 10 MG/ML
INJECTION, EMULSION INTRAVENOUS PRN
Status: DISCONTINUED | OUTPATIENT
Start: 2023-11-15 | End: 2023-11-15 | Stop reason: SDUPTHER

## 2023-11-15 RX ORDER — LIDOCAINE HYDROCHLORIDE 20 MG/ML
INJECTION, SOLUTION EPIDURAL; INFILTRATION; INTRACAUDAL; PERINEURAL PRN
Status: DISCONTINUED | OUTPATIENT
Start: 2023-11-15 | End: 2023-11-15 | Stop reason: SDUPTHER

## 2023-11-15 RX ORDER — PROCHLORPERAZINE EDISYLATE 5 MG/ML
5 INJECTION INTRAMUSCULAR; INTRAVENOUS
Status: DISCONTINUED | OUTPATIENT
Start: 2023-11-15 | End: 2023-11-15 | Stop reason: HOSPADM

## 2023-11-15 RX ORDER — ACETAMINOPHEN 500 MG
500 TABLET ORAL ONCE
Status: COMPLETED | OUTPATIENT
Start: 2023-11-15 | End: 2023-11-15

## 2023-11-15 RX ORDER — SODIUM CHLORIDE 9 MG/ML
INJECTION, SOLUTION INTRAVENOUS PRN
Status: DISCONTINUED | OUTPATIENT
Start: 2023-11-15 | End: 2023-11-15 | Stop reason: HOSPADM

## 2023-11-15 RX ORDER — LIDOCAINE HYDROCHLORIDE 10 MG/ML
1 INJECTION, SOLUTION INFILTRATION; PERINEURAL
Status: DISCONTINUED | OUTPATIENT
Start: 2023-11-15 | End: 2023-11-15 | Stop reason: HOSPADM

## 2023-11-15 RX ORDER — HYDROMORPHONE HYDROCHLORIDE 2 MG/ML
0.5 INJECTION, SOLUTION INTRAMUSCULAR; INTRAVENOUS; SUBCUTANEOUS EVERY 5 MIN PRN
Status: DISCONTINUED | OUTPATIENT
Start: 2023-11-15 | End: 2023-11-15 | Stop reason: HOSPADM

## 2023-11-15 RX ORDER — OXYCODONE HYDROCHLORIDE 5 MG/1
5 TABLET ORAL
Status: DISCONTINUED | OUTPATIENT
Start: 2023-11-15 | End: 2023-11-15 | Stop reason: HOSPADM

## 2023-11-15 RX ADMIN — PROPOFOL 80 MG: 10 INJECTION, EMULSION INTRAVENOUS at 16:22

## 2023-11-15 RX ADMIN — CEFTRIAXONE 1000 MG: 1 INJECTION, POWDER, FOR SOLUTION INTRAMUSCULAR; INTRAVENOUS at 16:22

## 2023-11-15 RX ADMIN — LIDOCAINE HYDROCHLORIDE 40 MG: 20 INJECTION, SOLUTION EPIDURAL; INFILTRATION; INTRACAUDAL; PERINEURAL at 16:22

## 2023-11-15 RX ADMIN — PROPOFOL 140 MCG/KG/MIN: 10 INJECTION, EMULSION INTRAVENOUS at 16:23

## 2023-11-15 RX ADMIN — SODIUM CHLORIDE, POTASSIUM CHLORIDE, SODIUM LACTATE AND CALCIUM CHLORIDE: 600; 310; 30; 20 INJECTION, SOLUTION INTRAVENOUS at 13:27

## 2023-11-15 RX ADMIN — ACETAMINOPHEN 500 MG: 500 TABLET, FILM COATED ORAL at 13:27

## 2023-11-15 ASSESSMENT — PAIN - FUNCTIONAL ASSESSMENT: PAIN_FUNCTIONAL_ASSESSMENT: 0-10

## 2023-11-15 NOTE — DISCHARGE INSTRUCTIONS
If you have had surgery in the past 7-10 days by one of our providers and are having fever, bleeding, or drainage from an incision, have an opening in an incision, or having issues urinating properly, please call [phone]. Prostate Biopsy and Ultrasound:   A prostate biopsy is a type of test. Your doctor takes small tissue samples from your prostate gland. Then another doctor looks at the tissue under a microscope to see if there are cancer cells. This test is done by a doctor who specializes in men's genital and urinary problems (urologist). It can be done in your doctor's office, a day surgery clinic, or a hospital operating room. To get the tissue samples from the prostate, the doctor inserts a thin needle through the rectum, the urethra, or the area between the anus and scrotum (perineum). The most common method is through the rectum. Your doctor may use ultrasound to help guide the needle. What else should you know about this test?  A prostate biopsy has a slight risk of causing problems such as infection or bleeding. If the biopsy went through your rectum, you may have a small amount of bleeding from your rectum for 2 to 3 days after the biopsy. You may have a little pain in your pelvic area. You may also have a little blood in your urine for 1 to 5 days. You may have some blood in your semen for a week or longer. Do not do heavy work or exercise for 4 hours after the test.  Your doctor will tell you how long it may take to get your results back. Follow-up care is a key part of your treatment and safety. Be sure to make and go to all appointments, and call your doctor if you are having problems. It's also a good idea to keep a list of the medicines you take. Ask your doctor when you can expect to have your test results.     After general anesthesia or intravenous sedation, for 24 hours or while taking prescription Narcotics:  Limit your activities  A responsible adult needs to be with you for the Narcotics:  Limit your activities  A responsible adult needs to be with you for the next 24 hours  Do not drive and operate hazardous machinery  Do not make important personal or business decisions  Do not drink alcoholic beverages  If you have not urinated within 8 hours after discharge, and you are experiencing discomfort from urinary retention, please go to the nearest ED. If you have sleep apnea and have a CPAP machine, please use it for all naps and sleeping. Please use caution when taking narcotics and any of your home medications that may cause drowsiness. *  Please give a list of your current medications to your Primary Care Provider. *  Please update this list whenever your medications are discontinued, doses are      changed, or new medications (including over-the-counter products) are added. *  Please carry medication information at all times in case of emergency situations. These are general instructions for a healthy lifestyle:  No smoking/ No tobacco products/ Avoid exposure to second hand smoke  Surgeon General's Warning:  Quitting smoking now greatly reduces serious risk to your health. Obesity, smoking, and sedentary lifestyle greatly increases your risk for illness  A healthy diet, regular physical exercise & weight monitoring are important for maintaining a healthy lifestyle    You may be retaining fluid if you have a history of heart failure or if you experience any of the following symptoms:  Weight gain of 3 pounds or more overnight or 5 pounds in a week, increased swelling in our hands or feet or shortness of breath while lying flat in bed. Please call your doctor as soon as you notice any of these symptoms; do not wait until your next office visit.

## 2023-11-15 NOTE — BRIEF OP NOTE
Brief Postoperative Note      Patient: Florentin Ludwig  YOB: 1965  MRN: [id number]    Date of Procedure: 11/15/2023    Pre-Op Diagnosis Codes:     * Elevated prostate specific antigen (PSA) [R97.20]    Post-Op Diagnosis: Same       Procedure(s):  PROSTATE BIOPSY FUSION    Surgeon(s):  Moris Gilliland DO    Assistant:  * No surgical staff found *    Anesthesia: Monitor Anesthesia Care    Estimated Blood Loss (mL): <04TR    Complications: none immediate    Specimens:   ID Type Source Tests Collected by Time Destination   A : VIJAY 1 RM Tissue Prostate SURGICAL PATHOLOGY Sowmya Wapella, DO 11/15/2023 1611    B : VIJAY 2 RM ANTERIOR  Tissue Prostate SURGICAL PATHOLOGY Sowmya Wapella, DO 11/15/2023 1612    C : LB Tissue Prostate SURGICAL PATHOLOGY Sowmya Wapella, DO 11/15/2023 1612    D : LM Tissue Prostate SURGICAL PATHOLOGY Sowmya Wapella, DO 11/15/2023 1612    E : LA Tissue Prostate SURGICAL PATHOLOGY Sowmya Wapella, DO 11/15/2023 1612    F : LLB Tissue Prostate SURGICAL PATHOLOGY Sowmya Wapella, DO 11/15/2023 1612    G : LLM Tissue Prostate SURGICAL PATHOLOGY Sowmya Wapella, DO 11/15/2023 1612    H : LLA Tissue Prostate SURGICAL PATHOLOGY Sowmya Wapella, DO 11/15/2023 1612    I : RB Tissue Prostate SURGICAL PATHOLOGY Sowmya Wapella, DO 11/15/2023 1612    J : RM Tissue Prostate SURGICAL PATHOLOGY Sowmya Wapella, DO 11/15/2023 1612    K : RA Tissue Prostate SURGICAL PATHOLOGY Sowmya Wapella, DO 11/15/2023 1612    L : RLB Tissue Prostate SURGICAL PATHOLOGY Sowmya Wapella, DO 11/15/2023 1612    M : RLM Tissue Prostate SURGICAL PATHOLOGY Sowmya Wapella, DO 11/15/2023 1612    N : RLA Tissue Prostate SURGICAL PATHOLOGY Sowmya Wapella, DO 11/15/2023 1612        Implants:  * No implants in log *      Drains: * No LDAs found *    Findings: see op note      Electronically signed by Sowmya Osman DO on 11/15/2023 at 4:45 PM

## 2023-11-15 NOTE — ANESTHESIA POSTPROCEDURE EVALUATION
Department of Anesthesiology  Postprocedure Note    Patient: Shahida Quiñones  MRN: [id number]  YOB: 1965  Date of evaluation: 11/15/2023      Procedure Summary     Date: 11/15/23 Room / Location:  MAIN OR 03 /  MAIN OR    Anesthesia Start: 1615 Anesthesia Stop: 1640    Procedure: PROSTATE BIOPSY FUSION (Anus) Diagnosis:       Elevated prostate specific antigen (PSA)      (Elevated prostate specific antigen (PSA) [R97.20])    Providers: Suhas Morse DO Responsible Provider: Vitaly Martin MD    Anesthesia Type: TIVA ASA Status: 3          Anesthesia Type: TIVA    Acosta Phase I: Acosta Score: 8    Acosta Phase II: Acosta Score: 10      Anesthesia Post Evaluation    Patient location during evaluation: PACU  Patient participation: complete - patient participated  Level of consciousness: awake and alert  Pain score: 1  Airway patency: patent  Nausea & Vomiting: no nausea  Complications: no  Cardiovascular status: blood pressure returned to baseline and hemodynamically stable  Respiratory status: acceptable  Hydration status: euvolemic  Multimodal analgesia pain management approach  Pain management: adequate and satisfactory to patient

## 2023-11-16 NOTE — OP NOTE
[address] [facility]  OPERATIVE REPORT    Name:  Antonio Rivera  MR#:  [id number]  :  1965  ACCOUNT #:  [de-identified]  DATE OF SERVICE:  11/15/2023    PREOPERATIVE DIAGNOSIS:  Elevated prostate-specific antigen. POSTOPERATIVE DIAGNOSIS:  Elevated prostate-specific antigen. PROCEDURE PERFORMED:  MRI fusion biopsies of the prostate. SURGEON:  Sebastian Gilliland DO    ASSISTANT:  None. ANESTHESIA:  MAC.    COMPLICATIONS:  None immediate. SPECIMENS REMOVED:  Prostate biopsies. IMPLANTS:  None. ESTIMATED BLOOD LOSS:  Less than 10 mL. CLINICAL HISTORY:  This is a 68-year-old gentleman who was recently found to have an elevated PSA of 5.5 on 2023. An MRI on 10/04/2023, shows PI-RADS three lesions at the right mid gland and right base. All risks, benefits, and alternatives to above-mentioned procedure have been reviewed and he is willing to proceed at this time. DESCRIPTION OF OPERATIVE PROCEDURE:  Patient consent was obtained. The patient was brought back to the operating room, at which time he was placed in a modified left lateral decubitus position. All pressure points were carefully padded and the patient was secured to the table. After the uneventful induction of MAC anesthesia, a digital rectal examination was performed. This revealed an anodular prostate. The transrectal ultrasound probe was then inserted into the rectum and the prostate was surveyed. The prostate was mildly heterogeneous in nature. No discrete lesions were seen. Volumetric measurements were obtained. Calculated prostate volume was 84 cm3. Using the fusion biopsy software, the previously marked MRI images were fused to the ultrasound images. There were two regions of interest, one corresponds to the lesion of the right mid gland and the second correspond to the lesion at the anterior right mid gland. Both of these areas were biopsied several times.   In addition, a standard sextant biopsy of the

## 2023-11-29 ENCOUNTER — OFFICE VISIT (OUTPATIENT)
Dept: UROLOGY | Age: 58
End: 2023-11-29
Payer: COMMERCIAL

## 2023-11-29 DIAGNOSIS — R97.20 ELEVATED PROSTATE SPECIFIC ANTIGEN (PSA): Primary | ICD-10-CM

## 2023-11-29 DIAGNOSIS — N40.1 BENIGN PROSTATIC HYPERPLASIA WITH LOWER URINARY TRACT SYMPTOMS, SYMPTOM DETAILS UNSPECIFIED: ICD-10-CM

## 2023-11-29 DIAGNOSIS — I10 PRIMARY HYPERTENSION: ICD-10-CM

## 2023-11-29 LAB
BILIRUBIN, URINE, POC: NEGATIVE
BLOOD URINE, POC: NEGATIVE
GLUCOSE URINE, POC: NEGATIVE
KETONES, URINE, POC: NEGATIVE
LEUKOCYTE ESTERASE, URINE, POC: NEGATIVE
NITRITE, URINE, POC: NEGATIVE
PH, URINE, POC: 6.5 (ref 4.6–8)
PROTEIN,URINE, POC: 100
SPECIFIC GRAVITY, URINE, POC: 1.02 (ref 1–1.03)
URINALYSIS CLARITY, POC: NORMAL
URINALYSIS COLOR, POC: NORMAL
UROBILINOGEN, POC: NORMAL

## 2023-11-29 PROCEDURE — 99214 OFFICE O/P EST MOD 30 MIN: CPT | Performed by: UROLOGY

## 2023-11-29 PROCEDURE — 81003 URINALYSIS AUTO W/O SCOPE: CPT | Performed by: UROLOGY

## 2023-11-29 RX ORDER — HYDRALAZINE HYDROCHLORIDE 50 MG/1
TABLET, FILM COATED ORAL
Qty: 60 TABLET | Refills: 0 | OUTPATIENT
Start: 2023-11-29

## 2023-11-29 RX ORDER — AMLODIPINE BESYLATE 10 MG/1
10 TABLET ORAL DAILY
Qty: 30 TABLET | Refills: 0 | OUTPATIENT
Start: 2023-11-29

## 2023-11-29 NOTE — PROGRESS NOTES
[facility]  [address] [phone]    Sergio Colon  : 1965     HPI   66 y.o., male returns in follow up for an elevated PSA. PSA was 5.5 on 23. MRI on 10/4/23 showed pirads 3 RM and RB lesions. Fusion biopsies completed on 11/15/23. Path neg for malignancy. Vol was 84g. He reports nocturia 1-4x per night on Flomax. Past Medical History:   Diagnosis Date    Glucose intolerance (impaired glucose tolerance)     pt denies this dx    Hyperlipidemia     pt denies this    Hypertension     controlled with med    Left shoulder pain     Morbid obesity ([address])     bmi =49.72     Past Surgical History:   Procedure Laterality Date    APPENDECTOMY  as child    COLONOSCOPY  2019    due for repeat in     FOOT SURGERY Left 2022    [place] FUSION FOOT BONES,MIDTARSAL,MULTI    PROSTATE BIOPSY N/A 11/15/2023    PROSTATE BIOPSY FUSION performed by Siena Naranjo DO at [facility] MAIN OR    SHOULDER ARTHROSCOPY Left 2018    Dr. Madhav Etienne Right 2021     Current Outpatient Medications   Medication Sig Dispense Refill    acetaminophen (TYLENOL) 500 MG tablet Take 2 tablets by mouth in the morning and at bedtime      ciprofloxacin (CIPRO) 500 MG tablet Take 1 tablet by mouth 2 times daily      amLODIPine (NORVASC) 10 MG tablet Take 1 tablet by mouth daily 30 tablet 0    atorvastatin (LIPITOR) 10 MG tablet Take 1 tablet by mouth daily 90 tablet 0    hydrALAZINE (APRESOLINE) 50 MG tablet TAKE 1 TABLET BY MOUTH TWO TIMES A DAY. 60 tablet 0    tamsulosin (FLOMAX) 0.4 MG capsule Take 1 capsule by mouth every evening 90 capsule 3    losartan-hydroCHLOROthiazide (HYZAAR) 100-25 MG per tablet Take 1 tablet by mouth daily 30 tablet 0    Multiple Vitamin (DAILY-SADE MULTIVITAMIN PO) Take 1 tablet by mouth daily       No current facility-administered medications for this visit.      No Known Allergies  Social History     Socioeconomic History

## 2024-01-03 DIAGNOSIS — I10 PRIMARY HYPERTENSION: ICD-10-CM

## 2024-01-03 RX ORDER — AMLODIPINE BESYLATE 10 MG/1
10 TABLET ORAL DAILY
Qty: 30 TABLET | Refills: 0 | OUTPATIENT
Start: 2024-01-03

## 2024-01-10 DIAGNOSIS — I10 PRIMARY HYPERTENSION: ICD-10-CM

## 2024-01-12 RX ORDER — AMLODIPINE BESYLATE 10 MG/1
10 TABLET ORAL DAILY
Qty: 30 TABLET | Refills: 0 | Status: SHIPPED | OUTPATIENT
Start: 2024-01-12

## 2024-01-12 RX ORDER — HYDRALAZINE HYDROCHLORIDE 50 MG/1
TABLET, FILM COATED ORAL
Qty: 60 TABLET | Refills: 0 | Status: SHIPPED | OUTPATIENT
Start: 2024-01-12

## 2024-02-02 DIAGNOSIS — E78.2 MIXED HYPERLIPIDEMIA: ICD-10-CM

## 2024-02-02 RX ORDER — ATORVASTATIN CALCIUM 10 MG/1
10 TABLET, FILM COATED ORAL DAILY
Qty: 30 TABLET | Refills: 2 | OUTPATIENT
Start: 2024-02-02

## 2024-02-10 DIAGNOSIS — I10 PRIMARY HYPERTENSION: ICD-10-CM

## 2024-02-12 RX ORDER — HYDRALAZINE HYDROCHLORIDE 50 MG/1
TABLET, FILM COATED ORAL
Qty: 60 TABLET | Refills: 0 | OUTPATIENT
Start: 2024-02-12

## 2024-02-26 DIAGNOSIS — I10 PRIMARY HYPERTENSION: Primary | ICD-10-CM

## 2024-02-26 DIAGNOSIS — E78.2 MIXED HYPERLIPIDEMIA: ICD-10-CM

## 2024-02-26 DIAGNOSIS — R73.03 PREDIABETES: ICD-10-CM

## 2024-02-26 DIAGNOSIS — I10 PRIMARY HYPERTENSION: ICD-10-CM

## 2024-02-26 ASSESSMENT — PATIENT HEALTH QUESTIONNAIRE - PHQ9
SUM OF ALL RESPONSES TO PHQ QUESTIONS 1-9: 0
1. LITTLE INTEREST OR PLEASURE IN DOING THINGS: NOT AT ALL
SUM OF ALL RESPONSES TO PHQ QUESTIONS 1-9: 0
SUM OF ALL RESPONSES TO PHQ QUESTIONS 1-9: 0
SUM OF ALL RESPONSES TO PHQ9 QUESTIONS 1 & 2: 0
SUM OF ALL RESPONSES TO PHQ9 QUESTIONS 1 & 2: 0
2. FEELING DOWN, DEPRESSED OR HOPELESS: NOT AT ALL
2. FEELING DOWN, DEPRESSED OR HOPELESS: NOT AT ALL
1. LITTLE INTEREST OR PLEASURE IN DOING THINGS: NOT AT ALL
SUM OF ALL RESPONSES TO PHQ QUESTIONS 1-9: 0

## 2024-02-26 NOTE — TELEPHONE ENCOUNTER
Pt called, requ refill of atorvastatin 10 mg, Hydralazine 50 mg, amlodipine 10 mg to last until his next appt 3/22.    Sent to [facility] on [address]

## 2024-02-27 RX ORDER — HYDRALAZINE HYDROCHLORIDE 50 MG/1
TABLET, FILM COATED ORAL
Qty: 60 TABLET | Refills: 0 | Status: SHIPPED | OUTPATIENT
Start: 2024-02-27

## 2024-02-27 RX ORDER — AMLODIPINE BESYLATE 10 MG/1
10 TABLET ORAL DAILY
Qty: 30 TABLET | Refills: 0 | Status: SHIPPED | OUTPATIENT
Start: 2024-02-27

## 2024-02-27 RX ORDER — ATORVASTATIN CALCIUM 10 MG/1
10 TABLET, FILM COATED ORAL DAILY
Qty: 90 TABLET | Refills: 0 | Status: SHIPPED | OUTPATIENT
Start: 2024-02-27

## 2024-03-15 ENCOUNTER — NURSE ONLY (OUTPATIENT)
Dept: INTERNAL MEDICINE CLINIC | Facility: CLINIC | Age: 59
End: 2024-03-15

## 2024-03-15 DIAGNOSIS — R73.03 PREDIABETES: ICD-10-CM

## 2024-03-15 DIAGNOSIS — E78.2 MIXED HYPERLIPIDEMIA: ICD-10-CM

## 2024-03-15 DIAGNOSIS — I10 PRIMARY HYPERTENSION: ICD-10-CM

## 2024-03-15 LAB
ALBUMIN SERPL-MCNC: 3.6 G/DL (ref 3.5–5)
ALBUMIN/GLOB SERPL: 0.9 (ref 0.4–1.6)
ALP SERPL-CCNC: 52 U/L (ref 50–136)
ALT SERPL-CCNC: 33 U/L (ref 12–65)
ANION GAP SERPL CALC-SCNC: 4 MMOL/L (ref 2–11)
APPEARANCE UR: CLEAR
AST SERPL-CCNC: 31 U/L (ref 15–37)
BACTERIA URNS QL MICRO: NEGATIVE /HPF
BASOPHILS # BLD: 0.1 K/UL (ref 0–0.2)
BASOPHILS NFR BLD: 1 % (ref 0–2)
BILIRUB SERPL-MCNC: 0.3 MG/DL (ref 0.2–1.1)
BILIRUB UR QL: NEGATIVE
BUN SERPL-MCNC: 16 MG/DL (ref 6–23)
CALCIUM SERPL-MCNC: 9.1 MG/DL (ref 8.3–10.4)
CASTS URNS QL MICRO: ABNORMAL /LPF (ref 0–2)
CHLORIDE SERPL-SCNC: 110 MMOL/L (ref 103–113)
CHOLEST SERPL-MCNC: 131 MG/DL
CO2 SERPL-SCNC: 28 MMOL/L (ref 21–32)
COLOR UR: ABNORMAL
CREAT SERPL-MCNC: 0.7 MG/DL (ref 0.8–1.5)
DIFFERENTIAL METHOD BLD: ABNORMAL
EOSINOPHIL # BLD: 0.2 K/UL (ref 0–0.8)
EOSINOPHIL NFR BLD: 3 % (ref 0.5–7.8)
EPI CELLS #/AREA URNS HPF: ABNORMAL /HPF (ref 0–5)
ERYTHROCYTE [DISTWIDTH] IN BLOOD BY AUTOMATED COUNT: 15.8 % (ref 11.9–14.6)
EST. AVERAGE GLUCOSE BLD GHB EST-MCNC: 140 MG/DL
GLOBULIN SER CALC-MCNC: 4 G/DL (ref 2.8–4.5)
GLUCOSE SERPL-MCNC: 113 MG/DL (ref 65–100)
GLUCOSE UR STRIP.AUTO-MCNC: NEGATIVE MG/DL
HBA1C MFR BLD: 6.5 % (ref 4.8–5.6)
HCT VFR BLD AUTO: 39.3 % (ref 41.1–50.3)
HDLC SERPL-MCNC: 37 MG/DL (ref 40–60)
HDLC SERPL: 3.5
HGB BLD-MCNC: 12.2 G/DL (ref 13.6–17.2)
HGB UR QL STRIP: NEGATIVE
IMM GRANULOCYTES # BLD AUTO: 0 K/UL (ref 0–0.5)
IMM GRANULOCYTES NFR BLD AUTO: 0 % (ref 0–5)
KETONES UR QL STRIP.AUTO: NEGATIVE MG/DL
LDLC SERPL CALC-MCNC: 75.2 MG/DL
LEUKOCYTE ESTERASE UR QL STRIP.AUTO: NEGATIVE
LYMPHOCYTES # BLD: 2.8 K/UL (ref 0.5–4.6)
LYMPHOCYTES NFR BLD: 49 % (ref 13–44)
MCH RBC QN AUTO: 24.9 PG (ref 26.1–32.9)
MCHC RBC AUTO-ENTMCNC: 31 G/DL (ref 31.4–35)
MCV RBC AUTO: 80.2 FL (ref 82–102)
MONOCYTES # BLD: 0.6 K/UL (ref 0.1–1.3)
MONOCYTES NFR BLD: 10 % (ref 4–12)
NEUTS SEG # BLD: 2.1 K/UL (ref 1.7–8.2)
NEUTS SEG NFR BLD: 37 % (ref 43–78)
NITRITE UR QL STRIP.AUTO: NEGATIVE
NRBC # BLD: 0 K/UL (ref 0–0.2)
PH UR STRIP: 6.5 (ref 5–9)
PLATELET # BLD AUTO: 348 K/UL (ref 150–450)
PMV BLD AUTO: 9.5 FL (ref 9.4–12.3)
POTASSIUM SERPL-SCNC: 3.8 MMOL/L (ref 3.5–5.1)
PROT SERPL-MCNC: 7.6 G/DL (ref 6.3–8.2)
PROT UR STRIP-MCNC: ABNORMAL MG/DL
RBC # BLD AUTO: 4.9 M/UL (ref 4.23–5.6)
RBC #/AREA URNS HPF: ABNORMAL /HPF (ref 0–5)
SODIUM SERPL-SCNC: 142 MMOL/L (ref 136–146)
SP GR UR REFRACTOMETRY: 1.02 (ref 1–1.02)
TRIGL SERPL-MCNC: 94 MG/DL (ref 35–150)
UROBILINOGEN UR QL STRIP.AUTO: 1 EU/DL (ref 0.2–1)
VLDLC SERPL CALC-MCNC: 18.8 MG/DL (ref 6–23)
WBC # BLD AUTO: 5.8 K/UL (ref 4.3–11.1)
WBC URNS QL MICRO: ABNORMAL /HPF (ref 0–4)

## 2024-03-22 ENCOUNTER — OFFICE VISIT (OUTPATIENT)
Dept: INTERNAL MEDICINE CLINIC | Facility: CLINIC | Age: 59
End: 2024-03-22
Payer: COMMERCIAL

## 2024-03-22 VITALS
WEIGHT: 315 LBS | TEMPERATURE: 98.1 F | HEIGHT: 68 IN | OXYGEN SATURATION: 98 % | RESPIRATION RATE: 16 BRPM | SYSTOLIC BLOOD PRESSURE: 144 MMHG | BODY MASS INDEX: 47.74 KG/M2 | HEART RATE: 77 BPM | DIASTOLIC BLOOD PRESSURE: 82 MMHG

## 2024-03-22 DIAGNOSIS — R73.03 PREDIABETES: ICD-10-CM

## 2024-03-22 DIAGNOSIS — I10 PRIMARY HYPERTENSION: Primary | ICD-10-CM

## 2024-03-22 DIAGNOSIS — Z12.11 SCREEN FOR COLON CANCER: ICD-10-CM

## 2024-03-22 DIAGNOSIS — E55.9 VITAMIN D INSUFFICIENCY: ICD-10-CM

## 2024-03-22 DIAGNOSIS — E78.2 MIXED HYPERLIPIDEMIA: ICD-10-CM

## 2024-03-22 DIAGNOSIS — E66.01 CLASS 3 SEVERE OBESITY DUE TO EXCESS CALORIES WITH SERIOUS COMORBIDITY AND BODY MASS INDEX (BMI) OF 45.0 TO 49.9 IN ADULT (HCC): ICD-10-CM

## 2024-03-22 DIAGNOSIS — R97.20 ELEVATED PROSTATE SPECIFIC ANTIGEN (PSA): ICD-10-CM

## 2024-03-22 PROBLEM — E66.813 CLASS 3 SEVERE OBESITY DUE TO EXCESS CALORIES WITH SERIOUS COMORBIDITY AND BODY MASS INDEX (BMI) OF 45.0 TO 49.9 IN ADULT: Status: ACTIVE | Noted: 2018-07-09

## 2024-03-22 PROCEDURE — 3077F SYST BP >= 140 MM HG: CPT | Performed by: INTERNAL MEDICINE

## 2024-03-22 PROCEDURE — 3079F DIAST BP 80-89 MM HG: CPT | Performed by: INTERNAL MEDICINE

## 2024-03-22 PROCEDURE — 99214 OFFICE O/P EST MOD 30 MIN: CPT | Performed by: INTERNAL MEDICINE

## 2024-03-22 RX ORDER — ATORVASTATIN CALCIUM 10 MG/1
10 TABLET, FILM COATED ORAL DAILY
Qty: 90 TABLET | Refills: 3 | Status: SHIPPED | OUTPATIENT
Start: 2024-03-22

## 2024-03-22 RX ORDER — LOSARTAN POTASSIUM AND HYDROCHLOROTHIAZIDE 25; 100 MG/1; MG/1
1 TABLET ORAL DAILY
Qty: 90 TABLET | Refills: 3 | Status: SHIPPED | OUTPATIENT
Start: 2024-03-22

## 2024-03-22 RX ORDER — AMLODIPINE BESYLATE 10 MG/1
10 TABLET ORAL DAILY
Qty: 90 TABLET | Refills: 3 | Status: SHIPPED | OUTPATIENT
Start: 2024-03-22

## 2024-03-22 RX ORDER — HYDRALAZINE HYDROCHLORIDE 50 MG/1
TABLET, FILM COATED ORAL
Qty: 180 TABLET | Refills: 3 | Status: SHIPPED | OUTPATIENT
Start: 2024-03-22

## 2024-03-22 ASSESSMENT — ENCOUNTER SYMPTOMS
SHORTNESS OF BREATH: 0
ABDOMINAL PAIN: 0
RHINORRHEA: 0
COUGH: 0
BLOOD IN STOOL: 0

## 2024-03-22 NOTE — PROGRESS NOTES
(Temporal)   Resp 16   Ht 1.727 m (5' 8\")   Wt (!) 144.7 kg (319 lb)   SpO2 98%   BMI 48.50 kg/m²     Examination:  Physical Exam  Vitals reviewed.   Constitutional:       Appearance: Normal appearance.   HENT:      Head: Normocephalic and atraumatic.      Nose: Nose normal.      Mouth/Throat:      Mouth: Mucous membranes are moist.      Pharynx: Oropharynx is clear.   Eyes:      Extraocular Movements: Extraocular movements intact.      Conjunctiva/sclera: Conjunctivae normal.      Pupils: Pupils are equal, round, and reactive to light.   Cardiovascular:      Rate and Rhythm: Normal rate and regular rhythm.      Pulses: Normal pulses.      Heart sounds: Normal heart sounds.   Pulmonary:      Effort: Pulmonary effort is normal.      Breath sounds: Normal breath sounds.   Abdominal:      General: Abdomen is flat. Bowel sounds are normal.      Palpations: Abdomen is soft.   Musculoskeletal:         General: Normal range of motion.      Cervical back: Normal range of motion and neck supple.   Skin:     General: Skin is warm and dry.   Neurological:      General: No focal deficit present.      Mental Status: He is alert and oriented to person, place, and time. Mental status is at baseline.   Psychiatric:         Mood and Affect: Mood normal.         Behavior: Behavior normal.           Assessment/Plan:    Ricardo was seen today for follow-up.    Diagnoses and all orders for this visit:    Primary hypertension  Recommended low sodium diet; Goal: take meds as prescribed, monitor blood pressure at home and call with readings.       -     amLODIPine (NORVASC) 10 MG tablet; Take 1 tablet by mouth daily  -     hydrALAZINE (APRESOLINE) 50 MG tablet; TAKE 1 TABLET BY MOUTH TWO TIMES A DAY.  -     losartan-hydroCHLOROthiazide (HYZAAR) 100-25 MG per tablet; Take 1 tablet by mouth daily  -     Urinalysis; Future    Class 3 severe obesity due to excess calories with serious comorbidity and body mass index (BMI) of 45.0 to 49.9 in

## 2024-03-25 ENCOUNTER — TELEPHONE (OUTPATIENT)
Dept: GASTROENTEROLOGY | Age: 59
End: 2024-03-25

## 2024-03-26 NOTE — TELEPHONE ENCOUNTER
Patient returned call and mentioned he didn't want to schedule at this time but will call back towards the end of the year to schedule.  Requested a recall letter to be sent to patient in September as a reminder to call to schedule, pt verbally understood

## 2024-05-29 ENCOUNTER — NURSE ONLY (OUTPATIENT)
Dept: UROLOGY | Age: 59
End: 2024-05-29

## 2024-05-29 DIAGNOSIS — R97.20 ELEVATED PROSTATE SPECIFIC ANTIGEN (PSA): ICD-10-CM

## 2024-05-30 LAB — PSA SERPL-MCNC: 5.6 NG/ML (ref 0–4)

## 2024-06-06 ENCOUNTER — OFFICE VISIT (OUTPATIENT)
Dept: UROLOGY | Age: 59
End: 2024-06-06
Payer: COMMERCIAL

## 2024-06-06 DIAGNOSIS — N40.1 BENIGN PROSTATIC HYPERPLASIA WITH LOWER URINARY TRACT SYMPTOMS, SYMPTOM DETAILS UNSPECIFIED: ICD-10-CM

## 2024-06-06 DIAGNOSIS — R97.20 ELEVATED PROSTATE SPECIFIC ANTIGEN (PSA): Primary | ICD-10-CM

## 2024-06-06 LAB
BILIRUBIN, URINE, POC: NEGATIVE
BLOOD URINE, POC: NEGATIVE
GLUCOSE URINE, POC: NEGATIVE
KETONES, URINE, POC: NEGATIVE
LEUKOCYTE ESTERASE, URINE, POC: NEGATIVE
NITRITE, URINE, POC: NEGATIVE
PH, URINE, POC: 6 (ref 4.6–8)
PROTEIN,URINE, POC: 30
SPECIFIC GRAVITY, URINE, POC: 1.03 (ref 1–1.03)
URINALYSIS CLARITY, POC: NORMAL
URINALYSIS COLOR, POC: NORMAL
UROBILINOGEN, POC: NORMAL

## 2024-06-06 PROCEDURE — 81003 URINALYSIS AUTO W/O SCOPE: CPT | Performed by: UROLOGY

## 2024-06-06 PROCEDURE — 99214 OFFICE O/P EST MOD 30 MIN: CPT | Performed by: UROLOGY

## 2024-06-06 NOTE — PROGRESS NOTES
[facility]  [address] [place] [address]  [phone]    Ricardo Joseph  : 1965     HPI   58 y.o., male returns in follow up for an elevated PSA.  PSA was 5.5 on 23.  MRI on 10/4/23 showed pirads 3 RM and RB lesions.  Fusion biopsies completed on 11/15/23.  Path neg for malignancy.  Vol was 84g.  He reports nocturia 1-4x per night on Flomax.  No new complaints.  PSA is now 5.6 on 24.      Past Medical History:   Diagnosis Date    Glucose intolerance (impaired glucose tolerance)     pt denies this dx    Hyperlipidemia     pt denies this    Hypertension     controlled with med    Left shoulder pain     Morbid obesity (HCC)     bmi =49.72     Past Surgical History:   Procedure Laterality Date    APPENDECTOMY  as child    COLONOSCOPY  2019    due for repeat in     FOOT SURGERY Left 2022    [place] FUSION FOOT BONES,MIDTARSAL,MULTI    PROSTATE BIOPSY N/A 11/15/2023    PROSTATE BIOPSY FUSION performed by Carlos Gilliland DO at [facility] MAIN OR    SHOULDER ARTHROSCOPY Left 2018    Dr. Fernandez    TOTAL KNEE ARTHROPLASTY Right 2021     Current Outpatient Medications   Medication Sig Dispense Refill    amLODIPine (NORVASC) 10 MG tablet Take 1 tablet by mouth daily 90 tablet 3    atorvastatin (LIPITOR) 10 MG tablet Take 1 tablet by mouth daily 90 tablet 3    hydrALAZINE (APRESOLINE) 50 MG tablet TAKE 1 TABLET BY MOUTH TWO TIMES A DAY. 180 tablet 3    losartan-hydroCHLOROthiazide (HYZAAR) 100-25 MG per tablet Take 1 tablet by mouth daily 90 tablet 3    acetaminophen (TYLENOL) 500 MG tablet Take 2 tablets by mouth in the morning and at bedtime      ciprofloxacin (CIPRO) 500 MG tablet Take 1 tablet by mouth 2 times daily      tamsulosin (FLOMAX) 0.4 MG capsule Take 1 capsule by mouth every evening 90 capsule 3    Multiple Vitamin (DAILY-SADE MULTIVITAMIN PO) Take 1 tablet by mouth daily       No current facility-administered medications for this visit.     No Known

## 2024-11-08 RX ORDER — TAMSULOSIN HYDROCHLORIDE 0.4 MG/1
CAPSULE ORAL
Qty: 30 CAPSULE | Refills: 11 | Status: SHIPPED | OUTPATIENT
Start: 2024-11-08

## 2025-01-14 ENCOUNTER — LAB (OUTPATIENT)
Dept: UROLOGY | Age: 60
End: 2025-01-14

## 2025-01-14 DIAGNOSIS — R97.20 ELEVATED PROSTATE SPECIFIC ANTIGEN (PSA): Primary | ICD-10-CM

## 2025-01-14 LAB — PSA SERPL-MCNC: 5 NG/ML (ref 0–4)

## 2025-01-21 ENCOUNTER — OFFICE VISIT (OUTPATIENT)
Dept: UROLOGY | Age: 60
End: 2025-01-21
Payer: COMMERCIAL

## 2025-01-21 DIAGNOSIS — R97.20 ELEVATED PROSTATE SPECIFIC ANTIGEN (PSA): Primary | ICD-10-CM

## 2025-01-21 DIAGNOSIS — N40.1 BENIGN PROSTATIC HYPERPLASIA WITH LOWER URINARY TRACT SYMPTOMS, SYMPTOM DETAILS UNSPECIFIED: ICD-10-CM

## 2025-01-21 LAB
BILIRUBIN, URINE, POC: NEGATIVE
BLOOD URINE, POC: NEGATIVE
GLUCOSE URINE, POC: NEGATIVE MG/DL
KETONES, URINE, POC: NEGATIVE MG/DL
LEUKOCYTE ESTERASE, URINE, POC: NEGATIVE
NITRITE, URINE, POC: NEGATIVE
PH, URINE, POC: 7 (ref 4.6–8)
PROTEIN,URINE, POC: NEGATIVE MG/DL
SPECIFIC GRAVITY, URINE, POC: 1.02 (ref 1–1.03)
URINALYSIS CLARITY, POC: NORMAL
URINALYSIS COLOR, POC: NORMAL
UROBILINOGEN, POC: NORMAL MG/DL

## 2025-01-21 PROCEDURE — 99213 OFFICE O/P EST LOW 20 MIN: CPT | Performed by: UROLOGY

## 2025-01-21 PROCEDURE — 81003 URINALYSIS AUTO W/O SCOPE: CPT | Performed by: UROLOGY

## 2025-01-21 NOTE — PROGRESS NOTES
[facility]  [address] [place] [address]  [phone]    Ricardo Joseph  : 1965     HPI   59 y.o., male returns in follow up for an elevated PSA.  PSA was 5.5 on 23.  MRI on 10/4/23 showed pirads 3 RM and RB lesions.  Fusion biopsies completed on 11/15/23.  Path neg for malignancy.  Vol was 84g.  He reports nocturia 1-2x per night on Flomax.  No new complaints.  PSA was 5.6 on 24 and is now 5.0 on 25.      Past Medical History:   Diagnosis Date    Glucose intolerance (impaired glucose tolerance)     pt denies this dx    Hyperlipidemia     pt denies this    Hypertension     controlled with med    Left shoulder pain     Morbid obesity     bmi =49.72     Past Surgical History:   Procedure Laterality Date    APPENDECTOMY  as child    COLONOSCOPY  2019    due for repeat in     FOOT SURGERY Left 2022    [place] FUSION FOOT BONES,MIDTARSAL,MULTI    PROSTATE BIOPSY N/A 11/15/2023    PROSTATE BIOPSY FUSION performed by Carlos Gilliland DO at [facility] MAIN OR    SHOULDER ARTHROSCOPY Left 2018    Dr. Fernandez    TOTAL KNEE ARTHROPLASTY Right 2021     Current Outpatient Medications   Medication Sig Dispense Refill    tamsulosin (FLOMAX) 0.4 MG capsule TAKE 1 CAPSULE BY MOUTH EVERY DAY IN THE EVENING 30 capsule 11    amLODIPine (NORVASC) 10 MG tablet Take 1 tablet by mouth daily 90 tablet 3    atorvastatin (LIPITOR) 10 MG tablet Take 1 tablet by mouth daily 90 tablet 3    hydrALAZINE (APRESOLINE) 50 MG tablet TAKE 1 TABLET BY MOUTH TWO TIMES A DAY. 180 tablet 3    losartan-hydroCHLOROthiazide (HYZAAR) 100-25 MG per tablet Take 1 tablet by mouth daily 90 tablet 3    acetaminophen (TYLENOL) 500 MG tablet Take 2 tablets by mouth in the morning and at bedtime      ciprofloxacin (CIPRO) 500 MG tablet Take 1 tablet by mouth 2 times daily      Multiple Vitamin (DAILY-SADE MULTIVITAMIN PO) Take 1 tablet by mouth daily       No current facility-administered medications for this

## 2025-03-29 DIAGNOSIS — E78.2 MIXED HYPERLIPIDEMIA: ICD-10-CM

## 2025-03-29 DIAGNOSIS — I10 PRIMARY HYPERTENSION: ICD-10-CM

## 2025-03-29 RX ORDER — AMLODIPINE BESYLATE 10 MG/1
10 TABLET ORAL DAILY
Qty: 30 TABLET | Refills: 11 | OUTPATIENT
Start: 2025-03-29

## 2025-03-29 RX ORDER — ATORVASTATIN CALCIUM 10 MG/1
10 TABLET, FILM COATED ORAL DAILY
Qty: 30 TABLET | Refills: 11 | OUTPATIENT
Start: 2025-03-29

## 2025-03-29 RX ORDER — LOSARTAN POTASSIUM AND HYDROCHLOROTHIAZIDE 25; 100 MG/1; MG/1
1 TABLET ORAL DAILY
Qty: 30 TABLET | Refills: 11 | OUTPATIENT
Start: 2025-03-29

## 2025-03-29 RX ORDER — HYDRALAZINE HYDROCHLORIDE 50 MG/1
50 TABLET, FILM COATED ORAL 2 TIMES DAILY
Qty: 60 TABLET | Refills: 11 | OUTPATIENT
Start: 2025-03-29

## 2025-05-08 NOTE — PROGRESS NOTES
Pt came in for catheter removal. 18f ybarra catheter removed without incident.  Pt instructed to push fluids, 6-8 glasses of water and if he has not voided on his own by 3:00pm to call us and come in this afternoon for reinsertion.

## 2025-05-09 ENCOUNTER — CLINICAL SUPPORT (OUTPATIENT)
Dept: UROLOGY | Age: 60
End: 2025-05-09

## 2025-05-09 DIAGNOSIS — R33.9 URINARY RETENTION: Primary | ICD-10-CM

## (undated) DEVICE — SUTURE VCRL SZ 0 L27IN ABSRB VLT L26MM CT-2 1/2 CIR J334H

## (undated) DEVICE — IMMOBILIZER ORTH LIGHTWEIGHT LG UNIV 9X7 IN PREMIERPRO

## (undated) DEVICE — MAX-CORE® DISPOSABLE CORE BIOPSY INSTRUMENT, 18G X 25CM: Brand: MAX-CORE

## (undated) DEVICE — 90-S ACCELERATOR, SUCTION PROBE, NON-BENDABLE, MAX CUT LEVEL 11: Brand: SERFAS ENERGY

## (undated) DEVICE — [RESECTOR CUTTER, ARTHROSCOPIC SHAVER BLADE,  DO NOT RESTERILIZE,  DO NOT USE IF PACKAGE IS DAMAGED,  KEEP DRY,  KEEP AWAY FROM SUNLIGHT]: Brand: FORMULA

## (undated) DEVICE — INTENDED FOR TISSUE SEPARATION, AND OTHER PROCEDURES THAT REQUIRE A SHARP SURGICAL BLADE TO PUNCTURE OR CUT.: Brand: BARD-PARKER ® STAINLESS STEEL BLADES

## (undated) DEVICE — STERILE PACKED. BORE DIAMETER 1.6 MM; ANGLE OF INSERTION 19° TO THE LONG AXIS OF THE TRANSDUCER: Brand: SINGLE-USE BIPLANE BIOPSY GUIDE

## (undated) DEVICE — TWINFIX 2 INCH SUTURE PASSER, STERILE: Brand: TWINFIX

## (undated) DEVICE — DRAPE,SHOULDER,BEACH CHAIR,STERILE: Brand: MEDLINE

## (undated) DEVICE — NEEDLE HYPO 18GA L1.5IN PNK S STL HUB POLYPR SHLD REG BVL

## (undated) DEVICE — 1.2MM XBRAID TT, 100% UHMWPE, VIOLET CO-BRAID: Brand: XBRAID

## (undated) DEVICE — 3M™ COBAN™ SELF-ADHERENT WRAP, 1586S, STERILE, 6 IN X 5 YD (15 CM X 4,5 M), 12 ROLLS/CASE: Brand: 3M™ COBAN™

## (undated) DEVICE — DRAPE TWL SURG 16X26IN BLU ORB04] ALLCARE INC]

## (undated) DEVICE — SUTURE MCRYL SZ 3-0 L27IN ABSRB UD L19MM PS-2 3/8 CIR PRIM Y427H

## (undated) DEVICE — REM POLYHESIVE ADULT PATIENT RETURN ELECTRODE: Brand: VALLEYLAB

## (undated) DEVICE — SET IRRIG W 96IN TBNG 4 LN FLX BG

## (undated) DEVICE — SYR 50ML LR LCK 1ML GRAD NSAF --

## (undated) DEVICE — STOCKINETTE,IMPERVIOUS,12X48,STERILE: Brand: MEDLINE

## (undated) DEVICE — 1.4MM ICONIX DISPOSABLE DRILL: Brand: ICONIX

## (undated) DEVICE — NDL SPNE QNCKE 18GX3.5IN LF --

## (undated) DEVICE — BUTTON SWITCH PENCIL BLADE ELECTRODE, HOLSTER: Brand: EDGE

## (undated) DEVICE — SURGICAL PROCEDURE PACK BASIC ST FRANCIS

## (undated) DEVICE — (D)PREP SKN CHLRAPRP APPL 26ML -- CONVERT TO ITEM 371833

## (undated) DEVICE — SUTURE NEEDLE SHUTTLE STERILE: Brand: ELITE

## (undated) DEVICE — [THREADED CANNULA.  DO NOT RESTERILIZE,  DO NOT USE IF PACKAGE IS DAMAGED]: Brand: DRI-LOK

## (undated) DEVICE — SOFT SILICONE HYDROCELLULAR FOAM DRESSING WITH LOCK AWAY LAYER: Brand: ALLEVYN LIFE XL 21X21 CTN10

## (undated) DEVICE — SET IRRIG DST FLX M CONN

## (undated) DEVICE — [AGGRESSIVE 6-FLUTE BARREL BUR, ARTHROSCOPIC SHAVER BLADE,  DO NOT RESTERILIZE,  DO NOT USE IF PACKAGE IS DAMAGED,  KEEP DRY,  KEEP AWAY FROM SUNLIGHT]: Brand: FORMULA

## (undated) DEVICE — RESTRAINT UNIVERSAL HEAD DISP --

## (undated) DEVICE — SOLUTION IRRIG 3000ML 0.9% SOD CHL FLX CONT 0797208] ICU MEDICAL INC]

## (undated) DEVICE — KENDALL SCD EXPRESS SLEEVES, KNEE LENGTH, LARGE: Brand: KENDALL SCD

## (undated) DEVICE — MASTISOL ADHESIVE LIQ 2/3ML

## (undated) DEVICE — COVER PRB W1XL11.8IN ENDOCAVITY CLR E BND NEOGUARD

## (undated) DEVICE — ULTRABRAID NO.2 WHITE SUTURE AND                                    NEEDLE ASSEMBLY, 38 INCH, 10 PER                                    BOX, STERILE: Brand: ULTRABRAID

## (undated) DEVICE — (D)STRIP SKN CLSR 0.5X4IN WHT --